# Patient Record
Sex: MALE | Race: WHITE | HISPANIC OR LATINO | Employment: FULL TIME | URBAN - METROPOLITAN AREA
[De-identification: names, ages, dates, MRNs, and addresses within clinical notes are randomized per-mention and may not be internally consistent; named-entity substitution may affect disease eponyms.]

---
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
## 2022-04-14 ENCOUNTER — OFFICE VISIT (OUTPATIENT)
Dept: MEDICAL GROUP | Facility: MEDICAL CENTER | Age: 22
End: 2022-04-14
Payer: OTHER GOVERNMENT

## 2022-04-14 VITALS
OXYGEN SATURATION: 97 % | TEMPERATURE: 98.6 F | WEIGHT: 202.82 LBS | RESPIRATION RATE: 16 BRPM | SYSTOLIC BLOOD PRESSURE: 132 MMHG | BODY MASS INDEX: 26.03 KG/M2 | HEART RATE: 64 BPM | DIASTOLIC BLOOD PRESSURE: 74 MMHG | HEIGHT: 74 IN

## 2022-04-14 DIAGNOSIS — E55.9 VITAMIN D INSUFFICIENCY: ICD-10-CM

## 2022-04-14 DIAGNOSIS — E66.3 OVERWEIGHT (BMI 25.0-29.9): ICD-10-CM

## 2022-04-14 DIAGNOSIS — M25.562 CHRONIC PAIN OF BOTH KNEES: ICD-10-CM

## 2022-04-14 DIAGNOSIS — Z00.00 PREVENTATIVE HEALTH CARE: ICD-10-CM

## 2022-04-14 DIAGNOSIS — Z76.89 ENCOUNTER TO ESTABLISH CARE WITH NEW DOCTOR: ICD-10-CM

## 2022-04-14 DIAGNOSIS — Z11.59 NEED FOR HEPATITIS C SCREENING TEST: ICD-10-CM

## 2022-04-14 DIAGNOSIS — M25.561 CHRONIC PAIN OF BOTH KNEES: ICD-10-CM

## 2022-04-14 DIAGNOSIS — Z11.4 SCREENING FOR HIV (HUMAN IMMUNODEFICIENCY VIRUS): ICD-10-CM

## 2022-04-14 DIAGNOSIS — G89.29 CHRONIC PAIN OF BOTH KNEES: ICD-10-CM

## 2022-04-14 DIAGNOSIS — Z13.79 GENETIC SCREENING: ICD-10-CM

## 2022-04-14 DIAGNOSIS — H92.01 ACUTE EAR PAIN, RIGHT: ICD-10-CM

## 2022-04-14 PROBLEM — M25.569 KNEE PAIN, CHRONIC: Status: ACTIVE | Noted: 2022-04-14

## 2022-04-14 PROCEDURE — 99213 OFFICE O/P EST LOW 20 MIN: CPT | Performed by: STUDENT IN AN ORGANIZED HEALTH CARE EDUCATION/TRAINING PROGRAM

## 2022-04-14 RX ORDER — AMOXICILLIN AND CLAVULANATE POTASSIUM 875; 125 MG/1; MG/1
1 TABLET, FILM COATED ORAL 2 TIMES DAILY
Qty: 14 TABLET | Refills: 0 | Status: SHIPPED | OUTPATIENT
Start: 2022-04-14 | End: 2022-04-21

## 2022-04-14 ASSESSMENT — FIBROSIS 4 INDEX: FIB4 SCORE: 0.85

## 2022-04-14 ASSESSMENT — PATIENT HEALTH QUESTIONNAIRE - PHQ9: CLINICAL INTERPRETATION OF PHQ2 SCORE: 0

## 2022-04-14 NOTE — PROGRESS NOTES
Subjective:     CC:  Diagnoses of Chronic pain of both knees, Acute ear pain, right, Vitamin D insufficiency, Overweight (BMI 25.0-29.9), Preventative health care, Need for hepatitis C screening test, Screening for HIV (human immunodeficiency virus), Genetic screening, and Encounter to establish care with new doctor were pertinent to this visit.    HISTORY OF THE PRESENT ILLNESS: Patient is a 22 y.o. male. This pleasant patient is here today to establish care and discuss chronic conditions. His prior PCP was none.    Problem   Vitamin D Insufficiency    Chronic condition. Previous vit D level 21 (12/2020). Not taking any supplements.     Preventative Health Care    Patient is here to establish care today. Feels well overall.    Discussed and offered the no cost genetic screening through [facility]. Patient is interested in participating.     Overweight (Bmi 25.0-29.9)    Chronic condition. He tries to eat healthy in general. He does regularly exercise with weight lifting 6/7 days/week and running 3 days/week.     Knee Pain, Chronic    Chronic condition for 1.5 yrs. He was seen by [facility] on 3/28/2022 and had X-rays of both knees which were normal. He was recommended to trial physical therapy for 2 weeks and follow up in 2 weeks with [facility] again. He is very athletic and exercises frequently with running and weight lifting. No known injuries or h/o injuries to knees.     Character: sharp pain  Onset: 1.5 yrs ago, progressively worsening  Location: tendon below the knee cap  Duration: constant  Exacerbating factors: running, weight lifting  Relieving factors: icing and Tylenol with mild relief  Associated symptoms: none  Severity: 8/10     Acute Ear Pain, Right    Acute issue. He denies history of frequent ear infections but did have ear infection in the past that responded well to antibiotics. He reports no known allergies to antibiotics.    Character: aching pain  Onset: 1 week ago  Location: right  "ear  Duration: intermittent  Exacerbating factors: none  Relieving factors: none  Associated symptoms: mild throat discomfort  Severity: mild         Current Outpatient Medications Ordered in Epic   Medication Sig Dispense Refill   • amoxicillin-clavulanate (AUGMENTIN) 875-125 MG Tab Take 1 Tablet by mouth 2 times a day for 7 days. 14 Tablet 0     No current Epic-ordered facility-administered medications on file.     Social history  Living situation: lives with mother at home  Occupation: works in [employer], studying in business marketing  Alcohol/tobacco/illicit drugs: never smoker, denies EtOH or illicit drugs    Health Maintenance: reviewed and discussed with patient  Vaccines: Tdap received 04/2014, Pfizer COVID #1 received 01/2022    ROS:   Gen: no fevers/chills  ENT: no sore throat, + right ear pain  Pulm: no sob, no cough  CV: no chest pain, no palpitations  GI: no nausea/vomiting, no diarrhea  : no dysuria  MSk: + knee pain  Skin: no rash  Neuro: no headaches, no numbness/tingling      Objective:     Exam: /74 (BP Location: Left arm, Patient Position: Sitting, BP Cuff Size: Adult)   Pulse 64   Temp 37 °C (98.6 °F) (Temporal)   Resp 16   Ht 1.88 m (6' 2\")   Wt 92 kg (202 lb 13.2 oz)   SpO2 97%  Body mass index is 26.04 kg/m².    General: Normal appearing. No distress.  HEENT: Normocephalic. Ears normal shape and contour, canals are clear bilaterally, tympanic membranes are benign, nasal mucosa benign, oropharynx is without erythema, edema or exudates.   Neck: Supple without JVD or bruit.  Pulmonary: Clear to ausculation. Normal effort. No rales, ronchi, or wheezing.  Cardiovascular: Regular rate and rhythm without murmur. Carotid and radial pulses are intact and equal bilaterally.  Abdomen: Soft, nontender, nondistended. Normal bowel sounds.  Neurologic: Grossly nonfocal  Skin: Warm and dry. No obvious lesions.  Musculoskeletal: Normal gait. No extremity cyanosis, clubbing, or edema. + mild TTP " to bilateral patellar tendon, otherwise normal bilateral knee ROM.  Psych: Normal mood and affect. Alert and oriented x3. Judgment and insight is normal.    Labs:   Lab Results   Component Value Date/Time    WBC 5.0 12/30/2020 07:52 AM    RBC 5.85 12/30/2020 07:52 AM    HEMOGLOBIN 15.6 12/30/2020 07:52 AM    HEMATOCRIT 49.5 12/30/2020 07:52 AM    MCV 84.6 12/30/2020 07:52 AM    MCH 26.7 (L) 12/30/2020 07:52 AM    MCHC 31.5 (L) 12/30/2020 07:52 AM    RDW 43.8 12/30/2020 07:52 AM    PLATELETCT 272 12/30/2020 07:52 AM    MPV 9.7 12/30/2020 07:52 AM      Lab Results   Component Value Date/Time    SODIUM 139 12/30/2020 07:52 AM    POTASSIUM 4.1 12/30/2020 07:52 AM    CHLORIDE 101 12/30/2020 07:52 AM    CO2 30 12/30/2020 07:52 AM    ANION 8.0 12/30/2020 07:52 AM    GLUCOSE 90 12/30/2020 07:52 AM    BUN 12 12/30/2020 07:52 AM    CREATININE 0.80 12/30/2020 07:52 AM    CREATININE 0.5 01/30/2009 07:54 AM    CALCIUM 9.8 12/30/2020 07:52 AM    ASTSGOT 69 (H) 12/30/2020 07:52 AM    ALTSGPT 36 12/30/2020 07:52 AM    TBILIRUBIN 0.6 12/30/2020 07:52 AM    ALBUMIN 4.6 12/30/2020 07:52 AM    TOTPROTEIN 7.1 12/30/2020 07:52 AM    GLOBULIN 2.5 12/30/2020 07:52 AM    AGRATIO 1.8 12/30/2020 07:52 AM     Lab Results   Component Value Date/Time    HBA1C 5.5 12/30/2020 07:52 AM      Lab Results   Component Value Date/Time    CHOLSTRLTOT 161 12/30/2020 0752    TRIGLYCERIDE 74 12/30/2020 0752    HDL 71 12/30/2020 0752    LDL 75 12/30/2020 0752     25-Hydroxy   Vitamin D 25 (ng/mL)   Date Value   12/30/2020 21 (L)       Assessment & Plan:   22 y.o. male with the following -    1. Chronic pain of both knees  Chronic condition, persistent. Likely patellar tendinopathy given his athletic history. Plan to start with trial of physical therapy. Advised to trial NSAIDs in addition to icing as needed for pain.   - cont to follow up with [facility] in 2 weeks  - Referral to Physical Therapy    2. Acute ear pain, right  Acute issue, persistent. Suspect  viral etiology at this time. Advised that if his symptoms not better over the weekend, can try a course of Augmentin per order.  - amoxicillin-clavulanate (AUGMENTIN) 875-125 MG Tab; Take 1 Tablet by mouth 2 times a day for 7 days.  Dispense: 14 Tablet; Refill: 0    3. Vitamin D insufficiency  Chronic condition, plan to reassess.  - VITAMIN D,25 HYDROXY; Future    4. Overweight (BMI 25.0-29.9)  - Comp Metabolic Panel; Future  - TSH WITH REFLEX TO FT4; Future    5. Preventative health care  - follow up 1 month for annual physical    6. Need for hepatitis C screening test  - HEP C VIRUS ANTIBODY; Future    7. Screening for HIV (human immunodeficiency virus)  - HIV AG/AB COMBO ASSAY SCREENING; Future    8. Genetic screening  - Referral to Genetic Research Studies    9. Encounter to establish care with new doctor      Return in about 4 weeks (around 5/12/2022) for annual physical.    Please note that this dictation was created using voice recognition software. I have made every reasonable attempt to correct obvious errors, but I expect that there are errors of grammar and possibly content that I did not discover before finalizing the note.

## 2022-04-29 ENCOUNTER — TELEPHONE (OUTPATIENT)
Dept: MEDICAL GROUP | Facility: MEDICAL CENTER | Age: 22
End: 2022-04-29
Payer: OTHER GOVERNMENT

## 2022-04-29 DIAGNOSIS — G89.29 CHRONIC PAIN OF BOTH KNEES: ICD-10-CM

## 2022-04-29 DIAGNOSIS — M25.562 CHRONIC PAIN OF BOTH KNEES: ICD-10-CM

## 2022-04-29 DIAGNOSIS — M25.561 CHRONIC PAIN OF BOTH KNEES: ICD-10-CM

## 2022-04-29 NOTE — TELEPHONE ENCOUNTER
VOICEMAIL  1. Caller Name: [facility]                           Call Back Number: [phone]    2. Message: Magda from [facility] called requesting new referral for the patient. The previous referral was only for less than 2 weeks, therefore a new one is needed.

## 2022-05-12 ENCOUNTER — OFFICE VISIT (OUTPATIENT)
Dept: MEDICAL GROUP | Facility: MEDICAL CENTER | Age: 22
End: 2022-05-12
Payer: OTHER GOVERNMENT

## 2022-05-12 VITALS
BODY MASS INDEX: 26.14 KG/M2 | WEIGHT: 203.71 LBS | OXYGEN SATURATION: 98 % | TEMPERATURE: 98.2 F | HEIGHT: 74 IN | DIASTOLIC BLOOD PRESSURE: 66 MMHG | RESPIRATION RATE: 16 BRPM | HEART RATE: 65 BPM | SYSTOLIC BLOOD PRESSURE: 110 MMHG

## 2022-05-12 DIAGNOSIS — M25.561 CHRONIC PAIN OF BOTH KNEES: ICD-10-CM

## 2022-05-12 DIAGNOSIS — M25.562 CHRONIC PAIN OF BOTH KNEES: ICD-10-CM

## 2022-05-12 DIAGNOSIS — G89.29 CHRONIC PAIN OF BOTH KNEES: ICD-10-CM

## 2022-05-12 PROCEDURE — 99213 OFFICE O/P EST LOW 20 MIN: CPT | Performed by: STUDENT IN AN ORGANIZED HEALTH CARE EDUCATION/TRAINING PROGRAM

## 2022-05-12 ASSESSMENT — FIBROSIS 4 INDEX: FIB4 SCORE: 0.85

## 2022-05-12 NOTE — PROGRESS NOTES
"Subjective:     CC: physical therapy    HPI:   Oleksandr presents today for physical therapy    Problem   Knee Pain, Chronic    Chronic condition for 1.5 yrs. Since last visit, he had recent insurance change and needs new referral order. He has done 3 sessions so far and found it helpful.     He was seen by [facility] on 3/28/2022 and had X-rays of both knees which were normal. He was recommended to trial physical therapy for 2 weeks and follow up in 2 weeks with [facility] again. He is very athletic and exercises frequently with running and weight lifting. No known injuries or h/o injuries to knees.     Character: sharp pain  Onset: 1.5 yrs ago, progressively worsening  Location: tendon below the knee cap  Duration: constant  Exacerbating factors: running, weight lifting  Relieving factors: icing and Tylenol with mild relief  Associated symptoms: none  Severity: 8/10         No current Epic-ordered outpatient medications on file.     No current Epic-ordered facility-administered medications on file.     Social history  Living situation: lives with mother at home  Occupation: works in [employer], studying in business marketing  Alcohol/tobacco/illicit drugs: never smoker, denies EtOH or illicit drugs     Health Maintenance: reviewed and discussed with patient  Vaccines: Tdap received 04/2014, Pfizer COVID #1 received 01/2022     ROS:   Gen: no fevers/chills  Pulm: no sob, no cough  CV: no chest pain, no palpitations  MSk: + knee pain  Neuro: no headaches, no numbness/tingling    Objective:     Exam:  /66 (BP Location: Left arm, Patient Position: Sitting, BP Cuff Size: Adult)   Pulse 65   Temp 36.8 °C (98.2 °F) (Temporal)   Resp 16   Ht 1.88 m (6' 2\")   Wt 92.4 kg (203 lb 11.3 oz)   SpO2 98%   BMI 26.15 kg/m²  Body mass index is 26.15 kg/m².    General: Normal appearing. No distress.  Pulmonary: Clear to ausculation. Normal effort. No rales, ronchi, or wheezing.  Cardiovascular: Regular rate and rhythm without " murmur.  Musculoskeletal: Normal gait. No extremity cyanosis, clubbing, or edema. + mild TTP to bilateral patellar tendon, otherwise normal bilateral knee ROM.    Labs: no new lab results since last visit    Assessment & Plan:     22 y.o. male with the following -     1. Chronic pain of both knees  Chronic condition, improved. Likely patellar tendinopathy given his athletic history. - cont physical therapy, new referral order placed  - cont NSAIDs and icing as needed for pain.   - Referral to Physical Therapy    Return in about 3 weeks (around 6/2/2022) for lab results.    Please note that this dictation was created using voice recognition software. I have made every reasonable attempt to correct obvious errors, but I expect that there are errors of grammar and possibly content that I did not discover before finalizing the note.

## 2022-06-03 ENCOUNTER — APPOINTMENT (OUTPATIENT)
Dept: MEDICAL GROUP | Facility: MEDICAL CENTER | Age: 22
End: 2022-06-03
Payer: OTHER GOVERNMENT

## 2022-07-14 ENCOUNTER — APPOINTMENT (OUTPATIENT)
Dept: MEDICAL GROUP | Facility: MEDICAL CENTER | Age: 22
End: 2022-07-14
Payer: OTHER GOVERNMENT

## 2022-07-23 ENCOUNTER — HOSPITAL ENCOUNTER (OUTPATIENT)
Dept: LAB | Facility: MEDICAL CENTER | Age: 22
End: 2022-07-23
Attending: STUDENT IN AN ORGANIZED HEALTH CARE EDUCATION/TRAINING PROGRAM
Payer: OTHER GOVERNMENT

## 2022-07-23 DIAGNOSIS — Z11.59 NEED FOR HEPATITIS C SCREENING TEST: ICD-10-CM

## 2022-07-23 DIAGNOSIS — E66.3 OVERWEIGHT (BMI 25.0-29.9): ICD-10-CM

## 2022-07-23 DIAGNOSIS — Z11.4 SCREENING FOR HIV (HUMAN IMMUNODEFICIENCY VIRUS): ICD-10-CM

## 2022-07-23 DIAGNOSIS — E55.9 VITAMIN D INSUFFICIENCY: ICD-10-CM

## 2022-07-23 LAB
25(OH)D3 SERPL-MCNC: 33 NG/ML (ref 30–100)
ALBUMIN SERPL BCP-MCNC: 4.7 G/DL (ref 3.2–4.9)
ALBUMIN/GLOB SERPL: 1.8 G/DL
ALP SERPL-CCNC: 109 U/L (ref 30–99)
ALT SERPL-CCNC: 24 U/L (ref 2–50)
ANION GAP SERPL CALC-SCNC: 11 MMOL/L (ref 7–16)
AST SERPL-CCNC: 24 U/L (ref 12–45)
BILIRUB SERPL-MCNC: 0.6 MG/DL (ref 0.1–1.5)
BUN SERPL-MCNC: 17 MG/DL (ref 8–22)
CALCIUM SERPL-MCNC: 9.7 MG/DL (ref 8.5–10.5)
CHLORIDE SERPL-SCNC: 104 MMOL/L (ref 96–112)
CO2 SERPL-SCNC: 26 MMOL/L (ref 20–33)
CREAT SERPL-MCNC: 1.06 MG/DL (ref 0.5–1.4)
GFR SERPLBLD CREATININE-BSD FMLA CKD-EPI: 103 ML/MIN/1.73 M 2
GLOBULIN SER CALC-MCNC: 2.6 G/DL (ref 1.9–3.5)
GLUCOSE SERPL-MCNC: 82 MG/DL (ref 65–99)
HCV AB SER QL: NORMAL
HIV 1+2 AB+HIV1 P24 AG SERPL QL IA: NORMAL
POTASSIUM SERPL-SCNC: 4.5 MMOL/L (ref 3.6–5.5)
PROT SERPL-MCNC: 7.3 G/DL (ref 6–8.2)
SODIUM SERPL-SCNC: 141 MMOL/L (ref 135–145)
TSH SERPL DL<=0.005 MIU/L-ACNC: 2.16 UIU/ML (ref 0.38–5.33)

## 2022-07-23 PROCEDURE — 86803 HEPATITIS C AB TEST: CPT

## 2022-07-23 PROCEDURE — 84443 ASSAY THYROID STIM HORMONE: CPT

## 2022-07-23 PROCEDURE — 36415 COLL VENOUS BLD VENIPUNCTURE: CPT

## 2022-07-23 PROCEDURE — 80053 COMPREHEN METABOLIC PANEL: CPT

## 2022-07-23 PROCEDURE — 82306 VITAMIN D 25 HYDROXY: CPT

## 2022-07-23 PROCEDURE — 87389 HIV-1 AG W/HIV-1&-2 AB AG IA: CPT

## 2022-07-25 NOTE — PROGRESS NOTES
"Subjective:     CC: annual physical    HPI:   Oleksandr presents today for annual physical    Problem   Other Fatigue    Chronic condition for past 3 months. He has been fatigued, mood swings, sometimes difficulty falling asleep. He gets about 6-7 hours of sleep nightly.    He reports occasional snoring and daytime sleepiness but not regularly.     Vitamin D Insufficiency    Chronic condition. Stable without supplements.     Preventative Health Care    Patient is here for annual physical today. Feels well overall.    Health Maintenance: reviewed and discussed with patient  Vaccines: Tdap received 04/2014, Pfizer COVID #1 received 01/2022     Overweight (Bmi 25.0-29.9)    Chronic condition. He tries to eat healthy in general. He does regularly exercise with weight lifting 6/7 days/week and running 3 days/week.         No current [facility]-ordered outpatient medications on file.     No current [facility]-ordered facility-administered medications on file.     Social history  Living situation: lives with mother at home  Occupation: works in [employer], studying in business marketing  Alcohol/tobacco/illicit drugs: never smoker, denies EtOH or illicit drugs     Health Maintenance: reviewed and discussed with patient  Vaccines: Tdap received 04/2014, Pfizer COVID #1 received 01/2022     ROS:   Gen: no fevers/chills, + fatigue, + mood swings  ENT: no sore throat  Pulm: no sob, no cough  CV: no chest pain, no palpitations  GI: no nausea/vomiting, no diarrhea  : no dysuria  MSk: no pain  Skin: no rash  Neuro: no headaches, no numbness/tingling    Objective:     Exam:  /60 (BP Location: Left arm, Patient Position: Sitting, BP Cuff Size: Adult)   Pulse 67   Temp 37.1 °C (98.7 °F) (Temporal)   Resp 16   Ht 1.88 m (6' 2\")   Wt 91 kg (200 lb 9.6 oz)   SpO2 97%   BMI 25.76 kg/m²  Body mass index is 25.76 kg/m².    General: Normal appearing. No distress.  HEENT: Normocephalic. Ears normal shape and contour, canals are clear " bilaterally, tympanic membranes are benign, nasal mucosa benign, oropharynx is without erythema, edema or exudates. Mallampati class 3.  Neck: Supple without JVD or bruit.  Pulmonary: Clear to ausculation. Normal effort. No rales, ronchi, or wheezing.  Cardiovascular: Regular rate and rhythm without murmur.  Abdomen: Soft, nontender, nondistended. Normal bowel sounds.  Neurologic: Grossly nonfocal  Skin: Warm and dry. No obvious lesions.  Musculoskeletal: Normal gait. No extremity cyanosis, clubbing, or edema.  Psych: Normal mood and affect. Alert and oriented x3. Judgment and insight is normal.    Labs:   Lab Results   Component Value Date/Time    SODIUM 141 07/23/2022 08:04 AM    POTASSIUM 4.5 07/23/2022 08:04 AM    CHLORIDE 104 07/23/2022 08:04 AM    CO2 26 07/23/2022 08:04 AM    ANION 11.0 07/23/2022 08:04 AM    GLUCOSE 82 07/23/2022 08:04 AM    BUN 17 07/23/2022 08:04 AM    CREATININE 1.06 07/23/2022 08:04 AM    CREATININE 0.5 01/30/2009 07:54 AM    CALCIUM 9.7 07/23/2022 08:04 AM    ASTSGOT 24 07/23/2022 08:04 AM    ALTSGPT 24 07/23/2022 08:04 AM    TBILIRUBIN 0.6 07/23/2022 08:04 AM    ALBUMIN 4.7 07/23/2022 08:04 AM    TOTPROTEIN 7.3 07/23/2022 08:04 AM    GLOBULIN 2.6 07/23/2022 08:04 AM    AGRATIO 1.8 07/23/2022 08:04 AM     Lab Results   Component Value Date/Time    TSHULTRASEN 2.160 07/23/2022 0804     25-Hydroxy   Vitamin D 25 (ng/mL)   Date Value   07/23/2022 33   12/30/2020 21 (L)       Assessment & Plan:     22 y.o. male with the following -     1. Preventative health care  - recommended age appropriate vaccinations  - follow up 1 year for annual physical    2. Other fatigue  Chronic condition, new to me. Plan to evaluate with labs per orders to rule out organic causes. He endorses work has been stressful which could be contributing to his symptoms. We also discussed possibility of sleep apnea as well. Will call patient with results and discuss next step as needed based on test results.  - VITAMIN  B12; Future  - FOLATE; Future  - CBC WITH DIFFERENTIAL; Future  - TESTOSTERONE BIO/SHBG MALE; Future    3. Vitamin D insufficiency  Chronic condition, stable.  - cont interval monitoring    4. Overweight (BMI 25.0-29.9)  -Advised healthy diet/weight loss, regular exercise    Return if symptoms worsen or fail to improve.    Please note that this dictation was created using voice recognition software. I have made every reasonable attempt to correct obvious errors, but I expect that there are errors of grammar and possibly content that I did not discover before finalizing the note.

## 2022-07-27 ENCOUNTER — OFFICE VISIT (OUTPATIENT)
Dept: MEDICAL GROUP | Facility: MEDICAL CENTER | Age: 22
End: 2022-07-27
Payer: OTHER GOVERNMENT

## 2022-07-27 VITALS
RESPIRATION RATE: 16 BRPM | HEART RATE: 67 BPM | TEMPERATURE: 98.7 F | OXYGEN SATURATION: 97 % | BODY MASS INDEX: 25.74 KG/M2 | WEIGHT: 200.6 LBS | SYSTOLIC BLOOD PRESSURE: 112 MMHG | DIASTOLIC BLOOD PRESSURE: 60 MMHG | HEIGHT: 74 IN

## 2022-07-27 DIAGNOSIS — E55.9 VITAMIN D INSUFFICIENCY: ICD-10-CM

## 2022-07-27 DIAGNOSIS — R53.83 OTHER FATIGUE: ICD-10-CM

## 2022-07-27 DIAGNOSIS — E66.3 OVERWEIGHT (BMI 25.0-29.9): ICD-10-CM

## 2022-07-27 DIAGNOSIS — Z00.00 PREVENTATIVE HEALTH CARE: ICD-10-CM

## 2022-07-27 PROCEDURE — 99395 PREV VISIT EST AGE 18-39: CPT | Mod: 25 | Performed by: STUDENT IN AN ORGANIZED HEALTH CARE EDUCATION/TRAINING PROGRAM

## 2022-07-27 ASSESSMENT — FIBROSIS 4 INDEX: FIB4 SCORE: 0.36

## 2022-07-30 ENCOUNTER — OFFICE VISIT (OUTPATIENT)
Dept: URGENT CARE | Facility: PHYSICIAN GROUP | Age: 22
End: 2022-07-30
Payer: OTHER GOVERNMENT

## 2022-07-30 VITALS
OXYGEN SATURATION: 95 % | WEIGHT: 195 LBS | BODY MASS INDEX: 25.84 KG/M2 | SYSTOLIC BLOOD PRESSURE: 136 MMHG | RESPIRATION RATE: 20 BRPM | TEMPERATURE: 102 F | HEART RATE: 87 BPM | HEIGHT: 73 IN | DIASTOLIC BLOOD PRESSURE: 80 MMHG

## 2022-07-30 DIAGNOSIS — H66.92 ACUTE LEFT OTITIS MEDIA: ICD-10-CM

## 2022-07-30 DIAGNOSIS — R50.9 FEVER IN ADULT: ICD-10-CM

## 2022-07-30 LAB
EXTERNAL QUALITY CONTROL: NORMAL
SARS-COV+SARS-COV-2 AG RESP QL IA.RAPID: NEGATIVE

## 2022-07-30 PROCEDURE — 87426 SARSCOV CORONAVIRUS AG IA: CPT | Performed by: NURSE PRACTITIONER

## 2022-07-30 PROCEDURE — 99214 OFFICE O/P EST MOD 30 MIN: CPT | Performed by: NURSE PRACTITIONER

## 2022-07-30 RX ORDER — AMOXICILLIN AND CLAVULANATE POTASSIUM 875; 125 MG/1; MG/1
1 TABLET, FILM COATED ORAL 2 TIMES DAILY
Qty: 14 TABLET | Refills: 0 | Status: SHIPPED | OUTPATIENT
Start: 2022-07-30 | End: 2022-08-06

## 2022-07-30 ASSESSMENT — FIBROSIS 4 INDEX: FIB4 SCORE: 0.36

## 2022-07-30 NOTE — PROGRESS NOTES
Chief Complaint   Patient presents with   • Ear Pain     Bilateral; 1x day   • Sinus Problem     Congestion- 2x day   • Body Aches       HISTORY OF PRESENT ILLNESS: Patient is a pleasant 22 y.o. male who presents today due to symptoms which started two days ago. Pt reports a cough, congestion, bilateral ear pain fever, chills, fatigue, malaise, and body aches. Denies chest pain, shortness of breath, or wheezing.  Furthermore, he does endorse 2 months of intermittent nasal congestion.  Denies history of sinus infections or surgeries.  Denies h/o asthma/copd/CAP. No immunocompromise. Has tried OTC cold medications without significant relief of symptoms. No recent ABX use. No other aggravating or alleviating factors. Reports no known exposure to COVID-19.       Patient Active Problem List    Diagnosis Date Noted   • Other fatigue 07/27/2022   • Vitamin D insufficiency 04/14/2022   • Preventative health care 04/14/2022   • Overweight (BMI 25.0-29.9) 04/14/2022   • Knee pain, chronic 04/14/2022   • Acute ear pain, right 04/14/2022       Allergies:Morphine    No current [facility]-ordered outpatient medications on file.     No current [employer]-ordered facility-administered medications on file.       History reviewed. No pertinent past medical history.    Social History     Tobacco Use   • Smoking status: Never Smoker   • Smokeless tobacco: Never Used   Vaping Use   • Vaping Use: Never used   Substance Use Topics   • Alcohol use: No   • Drug use: No       Family Status   Relation Name Status   • Mo  Alive   • Fa unk Alive   • Sis  Alive     Family History   Problem Relation Age of Onset   • Diabetes Mother    • Hypertension Mother    • No Known Problems Sister        ROS:  Review of Systems   Constitutional: Positive for subjective fever, chills, fatigue, malaise. Negative for weight loss.  HENT: Positive for congestion, ear pain. Negative for nosebleeds, and neck pain.    Eyes: Negative for vision changes.   Cardiovascular:  "Negative for chest pain, palpitations, orthopnea and leg swelling.   Respiratory: Positive for cough without sputum production. Negative for shortness of breath and wheezing.   Gastrointestinal: Negative for abdominal pain, vomiting or diarrhea.   Skin: Negative for rash, diaphoresis.     Exam:  /80 (BP Location: Left arm, Patient Position: Sitting, BP Cuff Size: Adult)   Pulse 87   Temp (!) 38.9 °C (102 °F) (Temporal)   Resp 20   Ht 1.854 m (6' 1\")   Wt 88.5 kg (195 lb)   SpO2 95%   General: well-nourished, well-developed male in NAD  Head: normocephalic, atraumatic  Eyes: PERRLA, EOM within normal limits, no conjunctival injection, no scleral icterus, visual fields and acuity grossly intact.  Ears: normal shape and symmetry, no tenderness, no discharge. External canals are without any significant edema or erythema.  Right tympanic membrane is without any inflammation, no effusion.  Left tympanic membrane is erythematous, injected, intact.  Gross auditory acuity is intact.  Nose: symmetrical without tenderness, mild discharge, erythema present bilateral nares.  Mouth/Throat: reasonable hygiene, no exudates or tonsillar enlargement. Mild erythema present.   Neck: no masses, range of motion within normal limits, no tracheal deviation.  Lymph: mild cervical adenopathy. No supraclavicular adenopathy.   Neuro: alert and oriented. Cranial nerves 1-12 grossly intact.   Cardiovascular: regular rate and rhythm without murmurs, rubs, or gallops. No edema.   Pulmonary: no distress. Chest is symmetrical with respiration. No wheezes, crackles, or rhonchi.   Musculoskeletal: appropriate muscle tone, gait is stable.  Skin: warm, dry, intact, no clubbing, no cyanosis.   Psych: appropriate mood, affect, judgement.       POC COVID-negative      Assessment/Plan:  1. Acute left otitis media  amoxicillin-clavulanate (AUGMENTIN) 875-125 MG Tab   2. Fever in adult  POCT SARS-COV Antigen MAJOR (Symptomatic only)    " amoxicillin-clavulanate (AUGMENTIN) 875-125 MG Tab             Patient presents with left-sided otitis media, Augmentin as directed.  Flonase and OTC allergy medication encouraged.  Rest, increase fluids, hand and respiratory hygiene. May take OTC medications as directed for symptom relief. STRICT ER precautions.   Supportive care, differential diagnoses, and indications for immediate follow-up discussed with patient.   Pathogenesis of diagnosis discussed including typical length and natural progression.  Instructed to return to clinic or nearest emergency department for any change in condition, further concerns, or worsening of symptoms.  Patient states understanding of the plan of care and discharge instructions.          DEBORA Bautista.

## 2022-10-17 ENCOUNTER — OFFICE VISIT (OUTPATIENT)
Dept: MEDICAL GROUP | Facility: MEDICAL CENTER | Age: 22
End: 2022-10-17
Payer: OTHER GOVERNMENT

## 2022-10-17 VITALS
SYSTOLIC BLOOD PRESSURE: 134 MMHG | HEIGHT: 73 IN | HEART RATE: 82 BPM | BODY MASS INDEX: 26.44 KG/M2 | WEIGHT: 199.52 LBS | OXYGEN SATURATION: 100 % | DIASTOLIC BLOOD PRESSURE: 72 MMHG | TEMPERATURE: 97.6 F

## 2022-10-17 DIAGNOSIS — H92.03 OTALGIA OF BOTH EARS: ICD-10-CM

## 2022-10-17 PROCEDURE — 99213 OFFICE O/P EST LOW 20 MIN: CPT | Performed by: FAMILY MEDICINE

## 2022-10-17 ASSESSMENT — FIBROSIS 4 INDEX: FIB4 SCORE: 0.36

## 2022-10-17 ASSESSMENT — ENCOUNTER SYMPTOMS
CHILLS: 0
SHORTNESS OF BREATH: 0
SPUTUM PRODUCTION: 1
DIZZINESS: 0
HEADACHES: 0
WEIGHT LOSS: 0
COUGH: 0
FEVER: 0

## 2022-10-17 NOTE — ASSESSMENT & PLAN NOTE
Acute.  Physical exam did not reveal any erythema or bulging to his TMs.  External canals are normal.  Suspect viral process as a source for the discomfort.  Discussed that if his symptoms continue to improve no need for antibiotic therapy.  If he started to develop a worsening of his symptoms may consider initiating antibiotic therapy for secondary infection.  Recommend ibuprofen 600 mg to help with any discomfort that he is experiencing.

## 2022-10-17 NOTE — PROGRESS NOTES
"Oleksandr Stevens is a pleasant 22 y.o. male here for   Chief Complaint   Patient presents with    Ear Fullness    Nasal Congestion      HPI:  Problem   Otalgia of Both Ears    X 1 week, both.  History of ear infection, last time was in the right ear 04/2022.  Denies nasal congestion, but + chest congestion.   Denies fevers or body aches.  Does note that today his congestion and ear pain has improved.  Concerned that he might have an ear infection.            Current Medicines (including changes today)  No current outpatient medications on file.     No current facility-administered medications for this visit.     Past Medical/ Surgical History  He  has no past medical history of Type II or unspecified type diabetes mellitus without mention of complication, not stated as uncontrolled.  He  has no past surgical history on file.    Review of Systems   Constitutional:  Negative for chills, fever, malaise/fatigue and weight loss.   HENT:  Positive for congestion (Rhinorrhea) and ear pain (Bilateral). Negative for ear discharge.    Respiratory:  Positive for sputum production. Negative for cough and shortness of breath.    Genitourinary: Negative.    Skin:  Negative for rash.   Neurological:  Negative for dizziness and headaches.       Objective:     /72 (BP Location: Right arm, Patient Position: Sitting, BP Cuff Size: Adult)   Pulse 82   Temp 36.4 °C (97.6 °F) (Temporal)   Ht 1.854 m (6' 1\")   Wt 90.5 kg (199 lb 8.3 oz)   SpO2 100%  Body mass index is 26.32 kg/m².    Physical Exam  Constitutional:       General: He is not in acute distress.  HENT:      Head: Normocephalic and atraumatic.      Right Ear: Tympanic membrane and ear canal normal.      Left Ear: Tympanic membrane and ear canal normal.   Eyes:      Conjunctiva/sclera: Conjunctivae normal.      Pupils: Pupils are equal, round, and reactive to light.   Pulmonary:      Effort: Pulmonary effort is normal. No respiratory distress.      Breath sounds: Normal " breath sounds and air entry.   Abdominal:      General: There is no distension.   Musculoskeletal:      Cervical back: Normal range of motion and neck supple.   Skin:     General: Skin is warm and dry.      Findings: No rash.   Neurological:      Mental Status: He is alert and oriented to person, place, and time.      Gait: Gait is intact.   Psychiatric:         Mood and Affect: Affect normal.      Imaging:  No recent imaging to review    Labs  No recent labs to review  Assessment and Plan:   The following treatment plan was discussed     Problem List Items Addressed This Visit       Otalgia of both ears     Acute.  Physical exam did not reveal any erythema or bulging to his TMs.  External canals are normal.  Suspect viral process as a source for the discomfort.  Discussed that if his symptoms continue to improve no need for antibiotic therapy.  If he started to develop a worsening of his symptoms may consider initiating antibiotic therapy for secondary infection.  Recommend ibuprofen 600 mg to help with any discomfort that he is experiencing.             Followup: Return if symptoms worsen or fail to improve.        Please note that this dictation was created using voice recognition software. I have made every reasonable attempt to correct obvious errors, but I expect that there are errors of grammar and possibly content that I did not discover before finalizing the note.

## 2022-12-07 ENCOUNTER — OFFICE VISIT (OUTPATIENT)
Dept: URGENT CARE | Facility: PHYSICIAN GROUP | Age: 22
End: 2022-12-07
Payer: OTHER GOVERNMENT

## 2022-12-07 VITALS
HEIGHT: 74 IN | SYSTOLIC BLOOD PRESSURE: 136 MMHG | WEIGHT: 207 LBS | OXYGEN SATURATION: 96 % | RESPIRATION RATE: 20 BRPM | BODY MASS INDEX: 26.56 KG/M2 | DIASTOLIC BLOOD PRESSURE: 68 MMHG | TEMPERATURE: 103.4 F | HEART RATE: 111 BPM

## 2022-12-07 DIAGNOSIS — R05.9 COUGH, UNSPECIFIED TYPE: ICD-10-CM

## 2022-12-07 LAB
EXTERNAL QUALITY CONTROL: NORMAL
FLUAV+FLUBV AG SPEC QL IA: NEGATIVE
INT CON NEG: NORMAL
INT CON POS: NORMAL
S PYO AG THROAT QL: NEGATIVE
SARS-COV+SARS-COV-2 AG RESP QL IA.RAPID: NEGATIVE

## 2022-12-07 PROCEDURE — 87426 SARSCOV CORONAVIRUS AG IA: CPT | Performed by: FAMILY MEDICINE

## 2022-12-07 PROCEDURE — 87880 STREP A ASSAY W/OPTIC: CPT | Performed by: FAMILY MEDICINE

## 2022-12-07 PROCEDURE — 99213 OFFICE O/P EST LOW 20 MIN: CPT | Performed by: FAMILY MEDICINE

## 2022-12-07 PROCEDURE — 87804 INFLUENZA ASSAY W/OPTIC: CPT | Performed by: FAMILY MEDICINE

## 2022-12-07 RX ORDER — ACETAMINOPHEN 500 MG
1000 TABLET ORAL ONCE
Status: COMPLETED | OUTPATIENT
Start: 2022-12-07 | End: 2022-12-07

## 2022-12-07 RX ADMIN — Medication 1000 MG: at 16:48

## 2022-12-07 ASSESSMENT — FIBROSIS 4 INDEX: FIB4 SCORE: 0.36

## 2022-12-08 NOTE — PROGRESS NOTES
"CC:  cough        Cough  This is a new problem. The current episode started 2 days ago. The problem has been unchanged. The problem occurs constantly. The cough is dry. Associated symptoms include : fatigue, muscle aches, fever. Pertinent negatives include no   headaches, nausea, vomiting, diarrhea, sweats, weight loss or wheezing. Nothing aggravates the symptoms.  Patient has tried nothing for the symptoms. There is no history of asthma.        No past medical history on file.      Social History     Tobacco Use    Smoking status: Never    Smokeless tobacco: Never   Vaping Use    Vaping Use: Never used   Substance Use Topics    Alcohol use: No    Drug use: No         No current outpatient medications on file prior to visit.     No current facility-administered medications on file prior to visit.                    Review of Systems   Constitutional: Negative for fever and weight loss.   HENT: negative for otalgia  Cardiovascular - denies chest pain or dyspnea  Respiratory: Positive for cough.  .  Negative for wheezing.    Neurological: Negative for headaches.   GI - denies nausea, vomiting or diarrhea  Neuro - denies numbness or tingling.            Objective:     /68 (BP Location: Right arm, Patient Position: Sitting, BP Cuff Size: Adult)   Pulse (!) 111   Temp (!) 39.7 °C (103.4 °F) (Temporal)   Resp 20   Ht 1.88 m (6' 2\")   Wt 93.9 kg (207 lb)   SpO2 96%     Physical Exam   Constitutional: patient is oriented to person, place, and time. Patient appears well-developed and well-nourished. No distress.   HENT:   Head: Normocephalic and atraumatic.   Right Ear: External ear normal.   Left Ear: External ear normal.   Nose: Mucosal edema  present. Right sinus exhibits no maxillary sinus tenderness. Left sinus exhibits no maxillary sinus tenderness.   Mouth/Throat: Mucous membranes are normal. No oral lesions.  No posterior pharyngeal erythema.  No oropharyngeal exudate or posterior oropharyngeal edema. "   Eyes: Conjunctivae and EOM are normal. Pupils are equal, round, and reactive to light. Right eye exhibits no discharge. Left eye exhibits no discharge. No scleral icterus.   Neck: Normal range of motion. Neck supple. No tracheal deviation present.   Cardiovascular: Normal rate, regular rhythm and normal heart sounds.  Exam reveals no friction rub.    Pulmonary/Chest: Effort normal. No respiratory distress. Patient has no wheezes or rhonchi. Patient has no rales.    Musculoskeletal:  exhibits no edema.   Lymphadenopathy:     Patient has no cervical adenopathy.      Neurological: patient is alert and oriented to person, place, and time.   Skin: Skin is warm and dry. No rash noted. No erythema.   Psychiatric: patient  has a normal mood and affect.  behavior is normal.   Nursing note and vitals reviewed.              Assessment/Plan:         1. Cough  Likely viral    Rapid strep, flu covid negative.     - acetaminophen (TYLENOL) tablet 1,000 mg      Differential diagnosis, natural history, supportive care, and indications for immediate follow-up discussed. All questions answered. Patient agrees with the plan of care.     Follow-up as needed if symptoms worsen or fail to improve to PCP, Urgent care or Emergency Room.     I have personally reviewed prior external notes and test results pertinent to today's visit.  I have independently reviewed and interpreted all diagnostics ordered during this urgent care acute visit.

## 2023-01-13 ENCOUNTER — OFFICE VISIT (OUTPATIENT)
Dept: MEDICAL GROUP | Facility: MEDICAL CENTER | Age: 23
End: 2023-01-13
Payer: OTHER GOVERNMENT

## 2023-01-13 VITALS
HEART RATE: 65 BPM | TEMPERATURE: 98.3 F | HEIGHT: 74 IN | DIASTOLIC BLOOD PRESSURE: 66 MMHG | WEIGHT: 216.05 LBS | RESPIRATION RATE: 16 BRPM | SYSTOLIC BLOOD PRESSURE: 114 MMHG | BODY MASS INDEX: 27.73 KG/M2 | OXYGEN SATURATION: 96 %

## 2023-01-13 DIAGNOSIS — H65.02 NON-RECURRENT ACUTE SEROUS OTITIS MEDIA OF LEFT EAR: ICD-10-CM

## 2023-01-13 PROBLEM — H92.02 ACUTE EAR PAIN, LEFT: Status: ACTIVE | Noted: 2023-01-13

## 2023-01-13 PROCEDURE — 99213 OFFICE O/P EST LOW 20 MIN: CPT | Performed by: STUDENT IN AN ORGANIZED HEALTH CARE EDUCATION/TRAINING PROGRAM

## 2023-01-13 RX ORDER — AMOXICILLIN AND CLAVULANATE POTASSIUM 875; 125 MG/1; MG/1
1 TABLET, FILM COATED ORAL 2 TIMES DAILY
Qty: 14 TABLET | Refills: 0 | Status: SHIPPED | OUTPATIENT
Start: 2023-01-13 | End: 2023-01-20

## 2023-01-13 NOTE — PROGRESS NOTES
"Subjective:     CC: left ear pain    HPI:   Oleksandr presents today for left ear pain    Problem   Non-Recurrent Acute Serous Otitis Media of Left Ear    Acute condition. Symptom is improving but traveling to [facility] for work.    Character: aching pain  Onset: 4 days ago  Location: left ear  Duration: intermittent  Exacerbating factors: none  Relieving factors: ibuprofen which helps  Associated symptoms: dry throat, denies fever, cough, runny nose  Severity: 6/10           Current Outpatient Medications Ordered in Epic   Medication Sig Dispense Refill    amoxicillin-clavulanate (AUGMENTIN) 875-125 MG Tab Take 1 Tablet by mouth 2 times a day for 7 days. 14 Tablet 0     No current Epic-ordered facility-administered medications on file.     Social history  Living situation: lives with mother at home  Occupation: works in [employer], studying in business marketing  Alcohol/tobacco/illicit drugs: never smoker, denies EtOH or illicit drugs     Health Maintenance: reviewed and discussed with patient  Vaccines: Tdap received 04/2014, Pfizer COVID #1 received 01/2022     ROS:   See HPI    Objective:     Exam:  /66 (BP Location: Left arm, Patient Position: Sitting, BP Cuff Size: Adult)   Pulse 65   Temp 36.8 °C (98.3 °F) (Temporal)   Resp 16   Ht 1.88 m (6' 2\")   Wt 98 kg (216 lb 0.8 oz)   SpO2 96%   BMI 27.74 kg/m²  Body mass index is 27.74 kg/m².    General: Normal appearing. No distress.  HEENT: Normocephalic. Ears normal shape and contour, canals are clear bilaterally, tympanic membranes mildly erythematous on left, nasal mucosa benign, oropharynx is without erythema, edema or exudates.  Neck: Supple without lymphadenopathy.  Pulmonary: Clear to ausculation. Normal effort. No rales, ronchi, or wheezing.  Cardiovascular: Regular rate and rhythm without murmur.    Assessment & Plan:     22 y.o. male with the following -     1. Non-recurrent acute serous otitis media of left ear  Acute condition, persistent. Rx " Augmentin per order. Advised ibuprofen as needed for pain.  - amoxicillin-clavulanate (AUGMENTIN) 875-125 MG Tab; Take 1 Tablet by mouth 2 times a day for 7 days.  Dispense: 14 Tablet; Refill: 0      Return if symptoms worsen or fail to improve.    Please note that this dictation was created using voice recognition software. I have made every reasonable attempt to correct obvious errors, but I expect that there are errors of grammar and possibly content that I did not discover before finalizing the note.

## 2023-08-31 ENCOUNTER — DOCUMENTATION (OUTPATIENT)
Dept: HEALTH INFORMATION MANAGEMENT | Facility: OTHER | Age: 23
End: 2023-08-31

## 2024-01-22 ENCOUNTER — OFFICE VISIT (OUTPATIENT)
Dept: URGENT CARE | Facility: PHYSICIAN GROUP | Age: 24
End: 2024-01-22
Payer: OTHER GOVERNMENT

## 2024-01-22 VITALS
SYSTOLIC BLOOD PRESSURE: 120 MMHG | WEIGHT: 212.8 LBS | HEART RATE: 77 BPM | BODY MASS INDEX: 27.31 KG/M2 | DIASTOLIC BLOOD PRESSURE: 76 MMHG | OXYGEN SATURATION: 95 % | RESPIRATION RATE: 16 BRPM | HEIGHT: 74 IN | TEMPERATURE: 98.3 F

## 2024-01-22 DIAGNOSIS — R05.1 ACUTE COUGH: ICD-10-CM

## 2024-01-22 DIAGNOSIS — J02.9 ACUTE PHARYNGITIS, UNSPECIFIED ETIOLOGY: ICD-10-CM

## 2024-01-22 DIAGNOSIS — J06.9 VIRAL UPPER RESPIRATORY TRACT INFECTION: ICD-10-CM

## 2024-01-22 LAB
FLUAV RNA SPEC QL NAA+PROBE: NEGATIVE
FLUBV RNA SPEC QL NAA+PROBE: NEGATIVE
RSV RNA SPEC QL NAA+PROBE: NEGATIVE
S PYO DNA SPEC NAA+PROBE: NOT DETECTED
SARS-COV-2 RNA RESP QL NAA+PROBE: NEGATIVE

## 2024-01-22 PROCEDURE — 0241U POCT CEPHEID COV-2, FLU A/B, RSV - PCR: CPT

## 2024-01-22 PROCEDURE — 3078F DIAST BP <80 MM HG: CPT

## 2024-01-22 PROCEDURE — 99213 OFFICE O/P EST LOW 20 MIN: CPT

## 2024-01-22 PROCEDURE — 87651 STREP A DNA AMP PROBE: CPT

## 2024-01-22 PROCEDURE — 3074F SYST BP LT 130 MM HG: CPT

## 2024-01-22 RX ORDER — BENZONATATE 100 MG/1
100 CAPSULE ORAL 3 TIMES DAILY PRN
Qty: 60 CAPSULE | Refills: 0 | Status: SHIPPED | OUTPATIENT
Start: 2024-01-22 | End: 2024-01-28

## 2024-01-22 RX ORDER — DEXTROMETHORPHAN HYDROBROMIDE AND PROMETHAZINE HYDROCHLORIDE 15; 6.25 MG/5ML; MG/5ML
5 SYRUP ORAL NIGHTLY PRN
Qty: 118 ML | Refills: 0 | Status: SHIPPED | OUTPATIENT
Start: 2024-01-22 | End: 2024-01-28

## 2024-01-22 RX ORDER — LIDOCAINE HYDROCHLORIDE 20 MG/ML
15 SOLUTION OROPHARYNGEAL 3 TIMES DAILY PRN
Qty: 100 ML | Refills: 0 | Status: SHIPPED | OUTPATIENT
Start: 2024-01-22 | End: 2024-01-27

## 2024-01-22 ASSESSMENT — ENCOUNTER SYMPTOMS
ABDOMINAL PAIN: 0
DIARRHEA: 0
VOMITING: 0
NAUSEA: 0
COUGH: 1
SINUS PAIN: 0
SORE THROAT: 1
SWOLLEN GLANDS: 0
HEADACHES: 0
NECK PAIN: 0
WHEEZING: 0
RHINORRHEA: 0

## 2024-01-23 NOTE — PROGRESS NOTES
"Subjective:   Oleksandr Stevens is a very pleasant 23 y.o. male who presents for:    Chief Complaint   Patient presents with    Congestion     Body aches, L earache, phlegm, x4 days        HPI:    URI   This is a new problem. Episode onset: four days ago. There has been no fever. Associated symptoms include congestion, coughing, ear pain and a sore throat. Pertinent negatives include no abdominal pain, chest pain, diarrhea, dysuria, headaches, joint pain, joint swelling, nausea, neck pain, plugged ear sensation, rash, rhinorrhea, sinus pain, sneezing, swollen glands, vomiting or wheezing. Associated symptoms comments: Denies significant fatigue. Treatments tried: ibuprofen, cough drops, DayQuil, NyQuil. The treatment provided mild relief.       ROS:    Review of Systems   HENT:  Positive for congestion, ear pain and sore throat. Negative for rhinorrhea, sinus pain and sneezing.    Respiratory:  Positive for cough. Negative for wheezing.    Cardiovascular:  Negative for chest pain.   Gastrointestinal:  Negative for abdominal pain, diarrhea, nausea and vomiting.   Genitourinary:  Negative for dysuria.   Musculoskeletal:  Negative for joint pain and neck pain.   Skin:  Negative for rash.   Neurological:  Negative for headaches.       Medications:      No current outpatient medications on file.       Allergies:     Allergies   Allergen Reactions    Morphine      \"sweat, nausea and neck pain\"       Problem List:     Patient Active Problem List   Diagnosis    Vitamin D insufficiency    Preventative health care    Overweight (BMI 25.0-29.9)    Knee pain, chronic    Acute ear pain, right    Other fatigue    Otalgia of both ears    Non-recurrent acute serous otitis media of left ear       Surgical History:    No past surgical history on file.    Past Social Hx:     Social History     Socioeconomic History    Marital status: Single    Highest education level: Some college, no degree   Tobacco Use    Smoking status: Never    " Smokeless tobacco: Never   Vaping Use    Vaping Use: Never used   Substance and Sexual Activity    Alcohol use: Yes     Comment: Occasional    Drug use: No    Sexual activity: Yes     Partners: Female     Birth control/protection: Condom     Social Determinants of Health     Financial Resource Strain: Low Risk  (4/12/2022)    Overall Financial Resource Strain (CARDIA)     Difficulty of Paying Living Expenses: Not hard at all   Food Insecurity: No Food Insecurity (4/12/2022)    Hunger Vital Sign     Worried About Running Out of Food in the Last Year: Never true     Ran Out of Food in the Last Year: Never true   Transportation Needs: No Transportation Needs (4/12/2022)    PRAPARE - Transportation     Lack of Transportation (Medical): No     Lack of Transportation (Non-Medical): No   Physical Activity: Sufficiently Active (4/12/2022)    Exercise Vital Sign     Days of Exercise per Week: 7 days     Minutes of Exercise per Session: 150+ min   Stress: No Stress Concern Present (4/12/2022)    [employer] - Occupational Stress Questionnaire     Feeling of Stress : Not at all   Social Connections: Moderately Isolated (4/12/2022)    Social Connection and Isolation Panel [NHANES]     Frequency of Communication with Friends and Family: Three times a week     Frequency of Social Gatherings with Friends and Family: Once a week     Attends Muslim Services: 1 to 4 times per year     Active Member of Clubs or Organizations: No     Attends Club or Organization Meetings: Never     Marital Status: Never    Housing Stability: Unknown (4/12/2022)    Housing Stability Vital Sign     Unable to Pay for Housing in the Last Year: No        Past Family Hx:      Family History   Problem Relation Age of Onset    Diabetes Mother     Hypertension Mother     No Known Problems Sister        Problem list, medications, and allergies reviewed by myself today in Epic.     Objective:     Vitals:    01/22/24 1549    BP: 120/76   Pulse: 77   Resp: 16   Temp: 36.8 °C (98.3 °F)   SpO2: 95%       Physical Exam  Vitals reviewed.   Constitutional:       General: He is not in acute distress.     Appearance: Normal appearance. He is not ill-appearing, toxic-appearing or diaphoretic.   HENT:      Head: Normocephalic and atraumatic.      Right Ear: External ear normal.      Left Ear: External ear normal.      Nose: Nose normal.      Mouth/Throat:      Mouth: Mucous membranes are moist.      Pharynx: Oropharynx is clear.   Eyes:      Extraocular Movements: Extraocular movements intact.      Conjunctiva/sclera: Conjunctivae normal.      Pupils: Pupils are equal, round, and reactive to light.   Cardiovascular:      Rate and Rhythm: Normal rate and regular rhythm.      Pulses: Normal pulses.      Heart sounds: Normal heart sounds.   Pulmonary:      Effort: Pulmonary effort is normal.      Breath sounds: Normal breath sounds.   Abdominal:      General: Abdomen is flat.      Palpations: Abdomen is soft.   Musculoskeletal:         General: Normal range of motion.      Cervical back: Normal range of motion and neck supple. No rigidity or tenderness.   Lymphadenopathy:      Cervical: No cervical adenopathy.   Skin:     General: Skin is warm and dry.   Neurological:      General: No focal deficit present.      Mental Status: He is alert and oriented to person, place, and time. Mental status is at baseline.   Psychiatric:         Mood and Affect: Mood normal.         Behavior: Behavior normal.         Thought Content: Thought content normal.         Judgment: Judgment normal.             Results from POCT:   Results for orders placed or performed in visit on 01/22/24   POCT CEPHEID GROUP A STREP - PCR   Result Value Ref Range    POC Group A Strep, PCR Not Detected Not Detected, Invalid   POCT CoV-2, Flu A/B, RSV by PCR   Result Value Ref Range    SARS-CoV-2 by PCR Negative Negative, Invalid    Influenza virus A RNA Negative Negative, Invalid     Influenza virus B, PCR Negative Negative, Invalid    RSV, PCR Negative Negative, Invalid       Assessment/Plan:     Diagnosis and associated orders:     1. Viral upper respiratory tract infection  - POCT CoV-2, Flu A/B, RSV by PCR    2. Acute cough  - promethazine-dextromethorphan (PROMETHAZINE-DM) 6.25-15 MG/5ML syrup; Take 5 mL by mouth at bedtime as needed for Cough.  Dispense: 118 mL; Refill: 0  - benzonatate (TESSALON) 100 MG Cap; Take 1 Capsule by mouth 3 times a day as needed for Cough.  Dispense: 60 Capsule; Refill: 0    3. Acute pharyngitis, unspecified etiology  - POCT CEPHEID GROUP A STREP - PCR  - lidocaine (XYLOCAINE) 2 % Solution; Take 15 mL by mouth 3 times a day as needed for Throat/Mouth Pain (Swish and spit as needed for severe throat pain) for up to 5 days.  Dispense: 100 mL; Refill: 0          Comments/MDM:     POCT COVID, flu, RSV, STREP negative in clinic  Patient is well appearing and in no acute distress  No evidence of lower respiratory involvement on exam today   May use benzonatate and promethazine-DM PRN for cough   Lidocaine swish and spit PRN For pharyngitis     We discussed a throat culture but due to short duration of symptoms and the fact it is unlikely to  we will hold off for now.  If the patient continues to have symptoms I advised to return.  The natural history of mono would suggest that the patient has not developed antibodies and so I will not do a mono spot at this time.  If the patient continues to have pharyngitis and constitutional symptoms I instructed them to return 2 weeks for Monospot.     Recommend symptomatic care:  OTC second generation antihistamine daily (cetirizine, desloratadine, fexofenadine, levocetirizine, and loratadine) daily IN COMBINATION WITH:  OTC decongestant (Sudafed - Pseudoephedrine) unless contraindication in place, such as hypertension, CAD, narrow-angle glaucoma. Use with caution if the patient has a history of cardiac  dysrhythmias, hyperthyroidism, DM, prostatic hypertrophy, and glaucoma should use with caution.  Intranasal fluticasone (Flonase) daily  Nasal saline rinses 2-3 times a day   May use short term nasal sprays, such as oxymetazoline (Afrin) to help relieve nasal discomfort, congestion, and/or pressure. Decongestant sprays should not be used longer than three consecutive days.   Nasal rinsing with saline nasal spray or salt water (e.g., neti pot) can help relieve nasal dryness.  Breathe Right nasal strips at night for nasal congestion  Ponaris nasal emmollient for nasal congestion, dryness, and inflammation (do not use with iodine sensitivity)  Cool mist humidification, chest rubs, warm tea with honey, increased fluid intake to thin secretions  Tylenol or ibuprofen as needed for fever control, body aches, and headaches.    If sore throat is present:   Warm salt water gargles, over-the-counter throat sprays, rest, hydration with frozen (eg, ice or popsicles) or warmed liquids, herbal tea containing licorice root, elm inner bark, marshmallow root, and licorice root aqueous dry extract, Cepacol lozenges, soft diet, honey, vitamin C, zinc lozenges, and elderberry supplements.  I considered other causes of pharyngitis including Group C, G strep, peritonsillar abscess, adam's angina, and retropharyngeal abscess but the patient's reported symptoms and my exam do not support these alternative diagnosis based on information I have available today.  This may change and I encouraged the patient to return to clinic if they are experiencing new symptoms or their symptoms fail to resolve with time as we cannot rule out all pathology from a single Urgent Care visit.   Return to clinic or go to the ED if symptoms worsen or fail to improve, or if patient should develop worsening/increasing sore throat, difficulty swallowing, drooling, change in voice, swollen glands, shortness of breath, ear pain, cough, congestion, fever/chills,  and/or any concerning symptoms.          All questions answered. Patient verbalized understanding and is in agreement with this plan of care.     If symptoms are worsening or not improving in 3-5 days, follow-up with PCP or return to UC. Differential diagnosis, natural history, and supportive care discussed. AVS handout given and reviewed with patient. Patient educated on red flags and when to seek treatment back in ED or UC.     I personally reviewed prior external notes and test results pertinent to today's visit.  I have independently reviewed and interpreted all diagnostics ordered during this urgent care visit.     This dictation has been created using voice recognition software. The accuracy of the dictation is limited by the abilities of the software. I expect there may be some errors of grammar and possibly content. I made every attempt to manually correct the errors within my dictation. However, errors related to voice recognition software may still exist and should be interpreted within the appropriate context.    This note was electronically signed by LAITH Herrera

## 2024-01-28 ENCOUNTER — OFFICE VISIT (OUTPATIENT)
Dept: URGENT CARE | Facility: PHYSICIAN GROUP | Age: 24
End: 2024-01-28
Payer: OTHER GOVERNMENT

## 2024-01-28 VITALS
BODY MASS INDEX: 26.31 KG/M2 | OXYGEN SATURATION: 94 % | WEIGHT: 205 LBS | HEIGHT: 74 IN | SYSTOLIC BLOOD PRESSURE: 110 MMHG | HEART RATE: 77 BPM | TEMPERATURE: 97.1 F | DIASTOLIC BLOOD PRESSURE: 74 MMHG | RESPIRATION RATE: 20 BRPM

## 2024-01-28 DIAGNOSIS — B96.89 BACTERIAL LOWER RESPIRATORY INFECTION: ICD-10-CM

## 2024-01-28 DIAGNOSIS — J22 BACTERIAL LOWER RESPIRATORY INFECTION: ICD-10-CM

## 2024-01-28 PROCEDURE — 99213 OFFICE O/P EST LOW 20 MIN: CPT | Performed by: PHYSICIAN ASSISTANT

## 2024-01-28 PROCEDURE — 3078F DIAST BP <80 MM HG: CPT | Performed by: PHYSICIAN ASSISTANT

## 2024-01-28 PROCEDURE — 3074F SYST BP LT 130 MM HG: CPT | Performed by: PHYSICIAN ASSISTANT

## 2024-01-28 RX ORDER — AMOXICILLIN AND CLAVULANATE POTASSIUM 875; 125 MG/1; MG/1
1 TABLET, FILM COATED ORAL 2 TIMES DAILY
Qty: 14 TABLET | Refills: 0 | Status: SHIPPED | OUTPATIENT
Start: 2024-01-28 | End: 2024-02-04

## 2024-01-28 RX ORDER — METHYLPREDNISOLONE 4 MG/1
4 TABLET ORAL DAILY
Qty: 21 TABLET | Refills: 0 | Status: SHIPPED | OUTPATIENT
Start: 2024-01-28

## 2024-01-28 ASSESSMENT — ENCOUNTER SYMPTOMS
SHORTNESS OF BREATH: 0
EYE DISCHARGE: 0
DIZZINESS: 0
SORE THROAT: 0
ABDOMINAL PAIN: 0
EYE PAIN: 0
SPUTUM PRODUCTION: 1
WHEEZING: 0
EYE REDNESS: 0
DIAPHORESIS: 0
HEADACHES: 0
CHILLS: 0
FEVER: 0
CONSTIPATION: 0
SINUS PAIN: 0
VOMITING: 0
DIARRHEA: 0
COUGH: 1
NAUSEA: 0

## 2024-01-28 NOTE — PROGRESS NOTES
"  Subjective:     Oleksandr Stevens  is a 23 y.o. male who presents for Cough (X2 weeks)       He presents today with a cough has been lingering over the last 2 weeks.  Please recall he was seen in urgent care on 1/20/2022  Number his cough suppressant medications but did not receive any benefit from these.  At this time he denies fever/chills/sweats, chest pain, shortness of breath, nausea/vomiting, abdominal pain, diarrhea.       Review of Systems   Constitutional:  Negative for chills, diaphoresis, fever and malaise/fatigue.   HENT:  Negative for congestion, ear discharge, sinus pain and sore throat.    Eyes:  Negative for pain, discharge and redness.   Respiratory:  Positive for cough and sputum production. Negative for shortness of breath and wheezing.    Cardiovascular:  Negative for chest pain.   Gastrointestinal:  Negative for abdominal pain, constipation, diarrhea, nausea and vomiting.   Neurological:  Negative for dizziness and headaches.      Allergies   Allergen Reactions    Morphine      \"sweat, nausea and neck pain\"     History reviewed. No pertinent past medical history.     Objective:   /74 (BP Location: Left arm, Patient Position: Sitting, BP Cuff Size: Adult)   Pulse 77   Temp 36.2 °C (97.1 °F) (Temporal)   Resp 20   Ht 1.88 m (6' 2\")   Wt 93 kg (205 lb)   SpO2 94%   BMI 26.32 kg/m²   Physical Exam  Vitals and nursing note reviewed.   Constitutional:       General: He is not in acute distress.     Appearance: Normal appearance. He is not ill-appearing, toxic-appearing or diaphoretic.   HENT:      Head: Normocephalic.      Right Ear: Tympanic membrane, ear canal and external ear normal. There is no impacted cerumen.      Left Ear: Tympanic membrane, ear canal and external ear normal. There is no impacted cerumen.      Nose: No congestion or rhinorrhea.      Mouth/Throat:      Mouth: Mucous membranes are moist.      Pharynx: No oropharyngeal exudate or posterior oropharyngeal erythema. "   Eyes:      General:         Right eye: No discharge.         Left eye: No discharge.      Conjunctiva/sclera: Conjunctivae normal.   Cardiovascular:      Rate and Rhythm: Normal rate and regular rhythm.   Pulmonary:      Effort: Pulmonary effort is normal. No respiratory distress.      Breath sounds: No stridor. Rhonchi (Clears with coughing) present. No wheezing.   Musculoskeletal:      Cervical back: Neck supple.   Lymphadenopathy:      Cervical: No cervical adenopathy.   Neurological:      General: No focal deficit present.      Mental Status: He is alert and oriented to person, place, and time.   Psychiatric:         Mood and Affect: Mood normal.         Behavior: Behavior normal.         Thought Content: Thought content normal.         Judgment: Judgment normal.             Diagnostic testing: None    Assessment/Plan:     Encounter Diagnoses   Name Primary?    Bacterial lower respiratory infection           Plan for care for today's complaint includes starting the patient on Augmentin and Medrol Dosepak for bacterial respiratory infection.  Continue Rest medications that were prescribed to him at his last office visit.  Continue to monitor symptoms and return to urgent care or follow-up with primary care provider if symptoms remain ongoing.  Follow-up in the emergency department if symptoms become severe, ER precautions discussed in office today..  Prescription for Augmentin, Medrol Dosepak provided.    See AVS Instructions below for written guidance provided to patient on after-visit management and care in addition to our verbal discussion during the visit.    Please note that this dictation was created using voice recognition software. I have attempted to correct all errors, but there may be sound-alike, spelling, grammar and possibly content errors that I did not discover before finalizing the note.    Mauricio Thapa PA-C

## 2024-08-08 ENCOUNTER — HOSPITAL ENCOUNTER (OUTPATIENT)
Dept: LAB | Facility: MEDICAL CENTER | Age: 24
End: 2024-08-08
Payer: COMMERCIAL

## 2024-08-08 ENCOUNTER — OFFICE VISIT (OUTPATIENT)
Dept: URGENT CARE | Facility: PHYSICIAN GROUP | Age: 24
End: 2024-08-08
Payer: COMMERCIAL

## 2024-08-08 ENCOUNTER — HOSPITAL ENCOUNTER (OUTPATIENT)
Facility: MEDICAL CENTER | Age: 24
End: 2024-08-08
Payer: COMMERCIAL

## 2024-08-08 VITALS
TEMPERATURE: 97.9 F | WEIGHT: 207 LBS | DIASTOLIC BLOOD PRESSURE: 68 MMHG | BODY MASS INDEX: 26.56 KG/M2 | HEIGHT: 74 IN | SYSTOLIC BLOOD PRESSURE: 106 MMHG | OXYGEN SATURATION: 99 % | HEART RATE: 71 BPM | RESPIRATION RATE: 18 BRPM

## 2024-08-08 DIAGNOSIS — Z20.2 STD EXPOSURE: ICD-10-CM

## 2024-08-08 DIAGNOSIS — B37.0 ORAL THRUSH: ICD-10-CM

## 2024-08-08 LAB
APPEARANCE UR: CLEAR
BILIRUB UR STRIP-MCNC: NORMAL MG/DL
COLOR UR AUTO: YELLOW
GLUCOSE UR STRIP.AUTO-MCNC: NORMAL MG/DL
HBV CORE AB SERPL QL IA: NONREACTIVE
HBV SURFACE AB SERPL IA-ACNC: <3.5 MIU/ML (ref 0–10)
HBV SURFACE AG SER QL: NORMAL
HCV AB SER QL: NORMAL
HIV 1+2 AB+HIV1 P24 AG SERPL QL IA: NORMAL
KETONES UR STRIP.AUTO-MCNC: NORMAL MG/DL
LEUKOCYTE ESTERASE UR QL STRIP.AUTO: NORMAL
NITRITE UR QL STRIP.AUTO: NORMAL
PH UR STRIP.AUTO: 6.5 [PH] (ref 5–8)
PROT UR QL STRIP: NORMAL MG/DL
RBC UR QL AUTO: NORMAL
SP GR UR STRIP.AUTO: 1.02
T PALLIDUM AB SER QL IA: NORMAL
UROBILINOGEN UR STRIP-MCNC: 0.2 MG/DL

## 2024-08-08 PROCEDURE — 87389 HIV-1 AG W/HIV-1&-2 AB AG IA: CPT

## 2024-08-08 PROCEDURE — 87491 CHLMYD TRACH DNA AMP PROBE: CPT

## 2024-08-08 PROCEDURE — 81002 URINALYSIS NONAUTO W/O SCOPE: CPT

## 2024-08-08 PROCEDURE — 86803 HEPATITIS C AB TEST: CPT

## 2024-08-08 PROCEDURE — 36415 COLL VENOUS BLD VENIPUNCTURE: CPT

## 2024-08-08 PROCEDURE — 86706 HEP B SURFACE ANTIBODY: CPT

## 2024-08-08 PROCEDURE — 87591 N.GONORRHOEAE DNA AMP PROB: CPT

## 2024-08-08 PROCEDURE — 87340 HEPATITIS B SURFACE AG IA: CPT

## 2024-08-08 PROCEDURE — 99214 OFFICE O/P EST MOD 30 MIN: CPT

## 2024-08-08 PROCEDURE — 86780 TREPONEMA PALLIDUM: CPT

## 2024-08-08 PROCEDURE — 86704 HEP B CORE ANTIBODY TOTAL: CPT

## 2024-08-08 RX ORDER — DOXYCYCLINE HYCLATE 100 MG
100 TABLET ORAL 2 TIMES DAILY
Qty: 14 TABLET | Refills: 0 | Status: SHIPPED | OUTPATIENT
Start: 2024-08-08 | End: 2024-08-15

## 2024-08-08 RX ORDER — CLOTRIMAZOLE 10 MG/1
10 LOZENGE ORAL
Qty: 35 TROCHE | Refills: 0 | Status: SHIPPED | OUTPATIENT
Start: 2024-08-08 | End: 2024-08-15

## 2024-08-08 ASSESSMENT — ENCOUNTER SYMPTOMS: FEVER: 0

## 2024-08-08 NOTE — PROGRESS NOTES
"Subjective:     CHIEF COMPLAINT  Chief Complaint   Patient presents with    Exposure to STD     Pt exposed to chlamydia x 5 days        HPI  Oleksandr Stevens is a very pleasant 24 y.o. male who presents requesting STD testing.  He was informed yesterday that his sexual partner tested positive for chlamydia.  They did not use condoms on their most recent sexual interaction.  He denies any penile discharge or burning with urination at this time.  He denies any rashes, lesions, or sores.  He states he is otherwise feeling well at this time.  He has noticed over the past week that his tongue has appeared to have a white coating and has been stinging.  He denies a sore throat.  He does not have a history of asthma and does not have an inhaler.      REVIEW OF SYSTEMS  Review of Systems   Constitutional:  Negative for fever.   Genitourinary:  Negative for dysuria.   Skin:  Negative for itching and rash.       PAST MEDICAL HISTORY  Patient Active Problem List    Diagnosis Date Noted    Non-recurrent acute serous otitis media of left ear 01/13/2023    Otalgia of both ears 10/17/2022    Other fatigue 07/27/2022    Vitamin D insufficiency 04/14/2022    Preventative health care 04/14/2022    Overweight (BMI 25.0-29.9) 04/14/2022    Knee pain, chronic 04/14/2022    Acute ear pain, right 04/14/2022       SURGICAL HISTORY  patient denies any surgical history    ALLERGIES  Allergies   Allergen Reactions    Morphine      \"sweat, nausea and neck pain\"       CURRENT MEDICATIONS  Home Medications       Reviewed by Deysi Rivera P.A.-C. (Physician Assistant) on 08/08/24 at 0831  Med List Status: <None>     Medication Last Dose Status   methylPREDNISolone (MEDROL DOSEPAK) 4 MG Tablet Therapy Pack Not Taking Active                    SOCIAL HISTORY  Social History     Tobacco Use    Smoking status: Never    Smokeless tobacco: Never   Vaping Use    Vaping status: Never Used   Substance and Sexual Activity    Alcohol use: Yes     " "Comment: Occasional    Drug use: No    Sexual activity: Yes     Partners: Female     Birth control/protection: Condom       FAMILY HISTORY  Family History   Problem Relation Age of Onset    Diabetes Mother     Hypertension Mother     No Known Problems Sister           Objective:     VITAL SIGNS: /68 (BP Location: Right arm, Patient Position: Sitting, BP Cuff Size: Adult)   Pulse 71   Temp 36.6 °C (97.9 °F) (Temporal)   Resp 18   Ht 1.88 m (6' 2\")   Wt 93.9 kg (207 lb)   SpO2 99%   BMI 26.58 kg/m²     PHYSICAL EXAM  Physical Exam  Vitals reviewed.   Constitutional:       General: He is not in acute distress.     Appearance: Normal appearance. He is not ill-appearing or toxic-appearing.   HENT:      Head: Normocephalic and atraumatic.      Mouth/Throat:      Mouth: Mucous membranes are moist.      Comments: Dorsal surface of tongue with white coating.  Pharynx appears mildly erythematous.  Uvula is midline.  Eyes:      Conjunctiva/sclera: Conjunctivae normal.      Pupils: Pupils are equal, round, and reactive to light.   Pulmonary:      Effort: Pulmonary effort is normal. No respiratory distress.   Skin:     General: Skin is warm and dry.   Neurological:      General: No focal deficit present.      Mental Status: He is alert and oriented to person, place, and time.   Psychiatric:         Mood and Affect: Mood normal.         Assessment/Plan:     1. STD exposure  - POCT Urinalysis  - Chlamydia/GC, PCR (Urine); Future  - HIV AG/AB Combo Assay Screening; Future  - Hepatitis C Virus Antibody; Future  - HEP B Surface Antibody; Future  - Hep B Core AB Total; Future  - Hep B Surface Antigen; Future  - T.Pallidum AB ZEYAD (Screening); Future  - doxycycline (VIBRAMYCIN) 100 MG Tab; Take 1 Tablet by mouth 2 times a day for 7 days.  Dispense: 14 Tablet; Refill: 0    2. Oral thrush  - clotrimazole (MYCELEX) 10 MG Peggy peggy; Take 1 Peggy by mouth 5 Times a Day for 7 days.  Dispense: 35 Peggy; Refill: 0  -Return to " clinic if symptoms worsen or fail to resolve  -Follow-up with primary care provider    MDM/Comments:  Patient has stable vital signs and is non-toxic appearing.  Patient initiated on doxycycline for empiric treatment of chlamydia while results are pending.  Patient has politely declined empiric treatment for gonorrhea.  Testing for HIV, hepatitis C, hepatitis B, and syphilis ordered.  Patient initiated on clotrimazole troches for oral thrush.  Discussed supportive care with hydration, rest, Tylenol/Ibuprofen as needed. Patient demonstrated understanding of treatment plan at this time and will RTC if symptoms worsen or fail to resolve.     Differential diagnosis, natural history, supportive care, and indications for immediate follow-up discussed. All questions answered. Patient agrees with the plan of care.    Follow-up as needed if symptoms worsen or fail to improve to PCP, Urgent care or Emergency Room.    I have personally reviewed prior external notes and test results pertinent to today's visit.  I have independently reviewed and interpreted all diagnostics ordered during this urgent care acute visit.   Discussed management options (risks,benefits, and alternatives to treatment). Pt expresses understanding and the treatment plan was agreed upon. Questions were encouraged and answered to pt's satisfaction.    Please note that this dictation was created using voice recognition software. I have made a reasonable attempt to correct obvious errors, but I expect that there are errors of grammar and possibly content that I did not discover before finalizing the note.

## 2024-08-09 ENCOUNTER — TELEPHONE (OUTPATIENT)
Dept: URGENT CARE | Facility: PHYSICIAN GROUP | Age: 24
End: 2024-08-09
Payer: COMMERCIAL

## 2024-08-09 LAB
C TRACH DNA SPEC QL NAA+PROBE: NEGATIVE
N GONORRHOEA DNA SPEC QL NAA+PROBE: NEGATIVE
SPECIMEN SOURCE: NORMAL

## 2024-08-09 NOTE — TELEPHONE ENCOUNTER
Good afternoon,  Pt called to get results from lbs that were done yesterday and informed pt you were off and were waiting on some results still from the looks of it but will contact pt once you receive all results.   Thank you

## 2024-08-10 ENCOUNTER — TELEPHONE (OUTPATIENT)
Dept: URGENT CARE | Facility: CLINIC | Age: 24
End: 2024-08-10
Payer: COMMERCIAL

## 2024-08-10 NOTE — TELEPHONE ENCOUNTER
Patient called and informed of results. Patient will continue taking Doxycyline given history of confirmed chlamydia exposure.

## 2024-08-31 ENCOUNTER — HOSPITAL ENCOUNTER (OUTPATIENT)
Facility: MEDICAL CENTER | Age: 24
End: 2024-08-31
Attending: FAMILY MEDICINE
Payer: COMMERCIAL

## 2024-08-31 ENCOUNTER — OFFICE VISIT (OUTPATIENT)
Dept: URGENT CARE | Facility: PHYSICIAN GROUP | Age: 24
End: 2024-08-31
Payer: COMMERCIAL

## 2024-08-31 VITALS
WEIGHT: 210 LBS | SYSTOLIC BLOOD PRESSURE: 110 MMHG | BODY MASS INDEX: 26.95 KG/M2 | DIASTOLIC BLOOD PRESSURE: 58 MMHG | HEIGHT: 74 IN | RESPIRATION RATE: 16 BRPM | HEART RATE: 81 BPM | OXYGEN SATURATION: 99 % | TEMPERATURE: 98.6 F

## 2024-08-31 DIAGNOSIS — Z11.3 SCREENING EXAMINATION FOR STI: ICD-10-CM

## 2024-08-31 PROCEDURE — 87491 CHLMYD TRACH DNA AMP PROBE: CPT

## 2024-08-31 PROCEDURE — 87591 N.GONORRHOEAE DNA AMP PROB: CPT

## 2024-08-31 NOTE — PROGRESS NOTES
"   Chief Complaint   Patient presents with    Sexually Transmitted Diseases     wants a routine check up        HPI:    Requesting STI screening only.       Currently asymptomatic.   Requesting GC only       Currently sexually active with one partner.   Does not use condoms.          No past medical history on file.      Social History     Tobacco Use    Smoking status: Never    Smokeless tobacco: Never   Vaping Use    Vaping status: Never Used   Substance Use Topics    Alcohol use: Yes     Comment: Occasional    Drug use: No                 Review of Systems   Constitutional: Negative for fever, chills and malaise/fatigue.   Eyes: Negative for vision changes, d/c.    Respiratory: Negative for cough and sputum production.    Cardiovascular: Negative for chest pain and palpitations.   Gastrointestinal: Negative for nausea, vomiting, abdominal pain, diarrhea and constipation.   Genitourinary: Negative for dysuria, urgency and frequency.   Skin: Negative for rash or  itching.   Neurological: Negative for dizziness and tingling.   Psychiatric/Behavioral: Negative for depression.   Hematologic/lymphatic - denies bruising or excessive bleeding  All other systems reviewed and are negative.          Objective  /58 (BP Location: Left arm, Patient Position: Sitting, BP Cuff Size: Adult)   Pulse 81   Temp 37 °C (98.6 °F) (Temporal)   Resp 16   Ht 1.88 m (6' 2\")   Wt 95.3 kg (210 lb)   SpO2 99%         HEENT - PERRLA, EOMI  Neuro - alert and oriented x3.    Lungs - CTA.    Heart - regular rate       Musculoskeletal - No lower extremity edema noted.        A/P:    1. Screening examination for STI     - Chlamydia/GC, PCR (Urine); Future      "

## 2024-09-01 DIAGNOSIS — A74.9 CHLAMYDIA: ICD-10-CM

## 2024-09-01 LAB
C TRACH DNA SPEC QL NAA+PROBE: POSITIVE
N GONORRHOEA DNA SPEC QL NAA+PROBE: NEGATIVE
SPECIMEN SOURCE: ABNORMAL

## 2024-09-01 RX ORDER — DOXYCYCLINE HYCLATE 100 MG
100 TABLET ORAL 2 TIMES DAILY
Qty: 14 TABLET | Refills: 0 | Status: SHIPPED | OUTPATIENT
Start: 2024-09-01 | End: 2024-09-08

## 2024-09-05 ENCOUNTER — APPOINTMENT (OUTPATIENT)
Dept: URGENT CARE | Facility: PHYSICIAN GROUP | Age: 24
End: 2024-09-05
Payer: COMMERCIAL

## 2024-09-06 ENCOUNTER — OFFICE VISIT (OUTPATIENT)
Dept: URGENT CARE | Facility: PHYSICIAN GROUP | Age: 24
End: 2024-09-06
Payer: COMMERCIAL

## 2024-09-06 VITALS
HEART RATE: 90 BPM | BODY MASS INDEX: 26.31 KG/M2 | OXYGEN SATURATION: 98 % | DIASTOLIC BLOOD PRESSURE: 76 MMHG | RESPIRATION RATE: 18 BRPM | HEIGHT: 74 IN | TEMPERATURE: 98.2 F | SYSTOLIC BLOOD PRESSURE: 108 MMHG | WEIGHT: 205 LBS

## 2024-09-06 DIAGNOSIS — J06.9 UPPER RESPIRATORY INFECTION, ACUTE: ICD-10-CM

## 2024-09-06 LAB
FLUAV RNA SPEC QL NAA+PROBE: NEGATIVE
FLUBV RNA SPEC QL NAA+PROBE: NEGATIVE
RSV RNA SPEC QL NAA+PROBE: NEGATIVE
SARS-COV-2 RNA RESP QL NAA+PROBE: NEGATIVE

## 2024-09-06 PROCEDURE — 99213 OFFICE O/P EST LOW 20 MIN: CPT

## 2024-09-06 PROCEDURE — 0241U POCT CEPHEID COV-2, FLU A/B, RSV - PCR: CPT

## 2024-09-06 PROCEDURE — 3074F SYST BP LT 130 MM HG: CPT

## 2024-09-06 PROCEDURE — 3078F DIAST BP <80 MM HG: CPT

## 2024-09-06 RX ORDER — FLUTICASONE PROPIONATE 50 MCG
1 SPRAY, SUSPENSION (ML) NASAL DAILY
Qty: 16 G | Refills: 0 | Status: SHIPPED | OUTPATIENT
Start: 2024-09-06

## 2024-09-06 RX ORDER — BENZONATATE 100 MG/1
100 CAPSULE ORAL 3 TIMES DAILY PRN
Qty: 30 CAPSULE | Refills: 0 | Status: SHIPPED | OUTPATIENT
Start: 2024-09-06

## 2024-09-06 ASSESSMENT — ENCOUNTER SYMPTOMS
FEVER: 0
VOMITING: 0
SHORTNESS OF BREATH: 0
SORE THROAT: 1
NAUSEA: 0
COUGH: 1
CHILLS: 0
ABDOMINAL PAIN: 0

## 2024-09-07 NOTE — PROGRESS NOTES
Chief Complaint   Patient presents with    Congestion     R ear pain, x1 week        HISTORY OF PRESENT ILLNESS: Patient is a pleasant 24 y.o. male who presents to urgent care today ongoing congestion with right ear pain and a cough for the last couple of days.  Patient has not been taking anything for his symptoms, denies any major chronic medical conditions.    Patient Active Problem List    Diagnosis Date Noted    Non-recurrent acute serous otitis media of left ear 01/13/2023    Otalgia of both ears 10/17/2022    Other fatigue 07/27/2022    Vitamin D insufficiency 04/14/2022    Preventative health care 04/14/2022    Overweight (BMI 25.0-29.9) 04/14/2022    Knee pain, chronic 04/14/2022    Acute ear pain, right 04/14/2022       Allergies:Morphine    Current Outpatient Medications Ordered in Epic   Medication Sig Dispense Refill    fluticasone (FLONASE) 50 MCG/ACT nasal spray Administer 1 Spray into affected nostril(S) every day. 16 g 0    benzonatate (TESSALON) 100 MG Cap Take 1 Capsule by mouth 3 times a day as needed for Cough. 30 Capsule 0    doxycycline (VIBRAMYCIN) 100 MG Tab Take 1 Tablet by mouth 2 times a day for 7 days. (Patient not taking: Reported on 9/6/2024) 14 Tablet 0    methylPREDNISolone (MEDROL DOSEPAK) 4 MG Tablet Therapy Pack Take 1 Tablet by mouth every day. Follow schedule on package instructions. (Patient not taking: Reported on 8/8/2024) 21 Tablet 0     No current Epic-ordered facility-administered medications on file.       History reviewed. No pertinent past medical history.    Social History     Tobacco Use    Smoking status: Never    Smokeless tobacco: Never   Vaping Use    Vaping status: Never Used   Substance Use Topics    Alcohol use: Yes     Comment: Occasional    Drug use: No       Family Status   Relation Name Status    Mo  Alive    Fa unk Alive    Sis  Alive   No partnership data on file     Family History   Problem Relation Age of Onset    Diabetes Mother     Hypertension Mother  "    No Known Problems Sister        Review of Systems   Constitutional:  Negative for chills, fever and malaise/fatigue.   HENT:  Positive for congestion, ear pain and sore throat.    Respiratory:  Positive for cough. Negative for shortness of breath.    Gastrointestinal:  Negative for abdominal pain, nausea and vomiting.   Skin:  Negative for rash.       Exam:  /76 (BP Location: Left arm, Patient Position: Sitting, BP Cuff Size: Adult)   Pulse 90   Temp 36.8 °C (98.2 °F) (Temporal)   Resp 18   Ht 1.88 m (6' 2\")   Wt 93 kg (205 lb)   SpO2 98%   Physical Exam  Vitals reviewed.   Constitutional:       Appearance: Normal appearance.   HENT:      Head: Normocephalic.      Right Ear: Tympanic membrane and ear canal normal. There is no impacted cerumen. Tympanic membrane is not injected or erythematous.      Left Ear: Tympanic membrane and ear canal normal. There is no impacted cerumen. Tympanic membrane is not injected or erythematous.      Nose: Congestion present. No rhinorrhea.      Mouth/Throat:      Mouth: Mucous membranes are moist.      Pharynx: Oropharynx is clear. Posterior oropharyngeal erythema present. No oropharyngeal exudate.      Tonsils: No tonsillar exudate. 0 on the right. 0 on the left.   Eyes:      General:         Right eye: No discharge.         Left eye: No discharge.      Extraocular Movements: Extraocular movements intact.      Conjunctiva/sclera: Conjunctivae normal.      Pupils: Pupils are equal, round, and reactive to light.   Cardiovascular:      Rate and Rhythm: Normal rate and regular rhythm.      Pulses: Normal pulses.      Heart sounds: Normal heart sounds. No murmur heard.  Pulmonary:      Effort: Pulmonary effort is normal. No respiratory distress.      Breath sounds: Normal breath sounds. No stridor. No wheezing or rhonchi.   Chest:      Chest wall: No tenderness.   Musculoskeletal:         General: No swelling, tenderness, deformity or signs of injury. Normal range of " motion.      Cervical back: Normal range of motion.      Right lower leg: No edema.      Left lower leg: No edema.   Lymphadenopathy:      Cervical: No cervical adenopathy.   Skin:     General: Skin is warm and dry.      Capillary Refill: Capillary refill takes less than 2 seconds.      Findings: No bruising or rash.   Neurological:      General: No focal deficit present.      Mental Status: He is alert.      Sensory: No sensory deficit.      Motor: No weakness.      Coordination: Coordination normal.      Gait: Gait normal.   Psychiatric:         Mood and Affect: Mood normal.         Behavior: Behavior normal.         Thought Content: Thought content normal.         Judgment: Judgment normal.         Assessment/Plan:  1. Upper respiratory infection, acute  - POCT CoV-2, Flu A/B, RSV by PCR  - fluticasone (FLONASE) 50 MCG/ACT nasal spray; Administer 1 Spray into affected nostril(S) every day.  Dispense: 16 g; Refill: 0  - benzonatate (TESSALON) 100 MG Cap; Take 1 Capsule by mouth 3 times a day as needed for Cough.  Dispense: 30 Capsule; Refill: 0    Based on patient's physical presentation along with review of systems I do think they likely have a viral illness.  Patient is outside the window for flu treatment.  Vitals are within normal limits, lung sounds are clear to auscultation.  I did go ahead and place patient on Flonase and Tessalon Perles. Advised patient to drink plenty of fluids, take Motrin and Tylenol as needed, vitamin C, D.  Patient is aware of the plan of care and agreeable at this time, encouraged them to follow-up if they continue to get worse or do not improve.    Supportive care, differential diagnoses, and indications for immediate follow-up discussed with patient.   Pathogenesis of diagnosis discussed including typical length and natural progression.   Instructed to return to clinic or nearest emergency department for any change in condition, further concerns, or worsening of symptoms.  Patient  states understanding of the plan of care and discharge instructions.  Instructed to make an appointment, for follow up, with  primary care provider.      Please note that this dictation was created using voice recognition software. I have made every reasonable attempt to correct obvious errors, but I expect that there are errors of grammar and possibly content that I did not discover before finalizing the note.      Kelli HAQUE

## 2024-10-10 ENCOUNTER — OFFICE VISIT (OUTPATIENT)
Dept: URGENT CARE | Facility: CLINIC | Age: 24
End: 2024-10-10
Payer: COMMERCIAL

## 2024-10-10 VITALS
BODY MASS INDEX: 27.08 KG/M2 | DIASTOLIC BLOOD PRESSURE: 74 MMHG | WEIGHT: 211 LBS | TEMPERATURE: 98.6 F | RESPIRATION RATE: 17 BRPM | HEART RATE: 83 BPM | OXYGEN SATURATION: 96 % | HEIGHT: 74 IN | SYSTOLIC BLOOD PRESSURE: 108 MMHG

## 2024-10-10 DIAGNOSIS — R05.1 ACUTE COUGH: ICD-10-CM

## 2024-10-10 DIAGNOSIS — J98.8 RTI (RESPIRATORY TRACT INFECTION): ICD-10-CM

## 2024-10-10 PROCEDURE — 3074F SYST BP LT 130 MM HG: CPT | Performed by: NURSE PRACTITIONER

## 2024-10-10 PROCEDURE — 3078F DIAST BP <80 MM HG: CPT | Performed by: NURSE PRACTITIONER

## 2024-10-10 PROCEDURE — 99213 OFFICE O/P EST LOW 20 MIN: CPT | Performed by: NURSE PRACTITIONER

## 2024-10-10 RX ORDER — PREDNISONE 10 MG/1
TABLET ORAL
Qty: 20 TABLET | Refills: 0 | Status: SHIPPED | OUTPATIENT
Start: 2024-10-10

## 2024-10-10 RX ORDER — AZITHROMYCIN 250 MG/1
TABLET, FILM COATED ORAL
Qty: 6 TABLET | Refills: 0 | Status: SHIPPED | OUTPATIENT
Start: 2024-10-10

## 2024-10-10 RX ORDER — BENZONATATE 100 MG/1
100 CAPSULE ORAL 3 TIMES DAILY PRN
Qty: 60 CAPSULE | Refills: 0 | Status: SHIPPED | OUTPATIENT
Start: 2024-10-10

## 2024-11-13 ENCOUNTER — OFFICE VISIT (OUTPATIENT)
Dept: URGENT CARE | Facility: PHYSICIAN GROUP | Age: 24
End: 2024-11-13
Payer: COMMERCIAL

## 2024-11-13 ENCOUNTER — HOSPITAL ENCOUNTER (OUTPATIENT)
Dept: LAB | Facility: MEDICAL CENTER | Age: 24
End: 2024-11-13
Payer: COMMERCIAL

## 2024-11-13 VITALS
DIASTOLIC BLOOD PRESSURE: 70 MMHG | WEIGHT: 213 LBS | SYSTOLIC BLOOD PRESSURE: 116 MMHG | TEMPERATURE: 97.9 F | RESPIRATION RATE: 18 BRPM | OXYGEN SATURATION: 97 % | BODY MASS INDEX: 27.35 KG/M2 | HEART RATE: 66 BPM

## 2024-11-13 DIAGNOSIS — Z20.2 STD EXPOSURE: ICD-10-CM

## 2024-11-13 DIAGNOSIS — J06.9 UPPER RESPIRATORY INFECTION, ACUTE: ICD-10-CM

## 2024-11-13 DIAGNOSIS — J01.10 ACUTE NON-RECURRENT FRONTAL SINUSITIS: ICD-10-CM

## 2024-11-13 LAB
FLUAV RNA SPEC QL NAA+PROBE: NEGATIVE
FLUBV RNA SPEC QL NAA+PROBE: NEGATIVE
HBV CORE AB SERPL QL IA: NONREACTIVE
HBV SURFACE AB SERPL IA-ACNC: <3.5 MIU/ML (ref 0–10)
HBV SURFACE AG SER QL: NORMAL
HCV AB SER QL: NORMAL
HIV 1+2 AB+HIV1 P24 AG SERPL QL IA: NORMAL
RSV RNA SPEC QL NAA+PROBE: NEGATIVE
SARS-COV-2 RNA RESP QL NAA+PROBE: NEGATIVE
T PALLIDUM AB SER QL IA: NORMAL

## 2024-11-13 PROCEDURE — 87340 HEPATITIS B SURFACE AG IA: CPT

## 2024-11-13 PROCEDURE — 36415 COLL VENOUS BLD VENIPUNCTURE: CPT

## 2024-11-13 PROCEDURE — 99214 OFFICE O/P EST MOD 30 MIN: CPT

## 2024-11-13 PROCEDURE — 87389 HIV-1 AG W/HIV-1&-2 AB AG IA: CPT

## 2024-11-13 PROCEDURE — 86803 HEPATITIS C AB TEST: CPT

## 2024-11-13 PROCEDURE — 86780 TREPONEMA PALLIDUM: CPT

## 2024-11-13 PROCEDURE — 86704 HEP B CORE ANTIBODY TOTAL: CPT

## 2024-11-13 PROCEDURE — 0241U POCT CEPHEID COV-2, FLU A/B, RSV - PCR: CPT

## 2024-11-13 PROCEDURE — 86706 HEP B SURFACE ANTIBODY: CPT

## 2024-11-13 RX ORDER — FLUTICASONE PROPIONATE 50 MCG
2 SPRAY, SUSPENSION (ML) NASAL
Qty: 16 G | Refills: 1 | Status: SHIPPED | OUTPATIENT
Start: 2024-11-13

## 2024-11-13 ASSESSMENT — ENCOUNTER SYMPTOMS
BACK PAIN: 0
ABDOMINAL PAIN: 0
FEVER: 0
SHORTNESS OF BREATH: 0
CHILLS: 0
COUGH: 0
VOMITING: 0
NAUSEA: 0

## 2024-11-13 NOTE — PROGRESS NOTES
Chief Complaint   Patient presents with    Sexually Transmitted Diseases     Requesting test    Ear Pain     Bilateral x 3 days    Nasal Congestion       HISTORY OF PRESENT ILLNESS: Patient is a pleasant 24 y.o. male who presents to urgent care today patient is wishing to get STD testing, he states when he has intercourse with his partner that he experiences small cuts and he is slightly concerned.  He denies any ongoing sores, no penile discharge.  No pain from his abdomen.  No nausea or vomiting.    Secondary complaint of ongoing nasal congestion with bilateral ear pain.  This been ongoing for the last 3 days, patient has not taken anything for this.    Patient Active Problem List    Diagnosis Date Noted    Non-recurrent acute serous otitis media of left ear 01/13/2023    Otalgia of both ears 10/17/2022    Other fatigue 07/27/2022    Vitamin D insufficiency 04/14/2022    Preventative health care 04/14/2022    Overweight (BMI 25.0-29.9) 04/14/2022    Knee pain, chronic 04/14/2022    Acute ear pain, right 04/14/2022       Allergies:Patient has no known allergies.    Current Outpatient Medications Ordered in Epic   Medication Sig Dispense Refill    fluticasone (FLONASE) 50 MCG/ACT nasal spray Administer 2 Sprays into affected nostril(S) at bedtime. 16 g 1    amoxicillin-clavulanate (AUGMENTIN) 875-125 MG Tab Take 1 Tablet by mouth 2 times a day for 5 days. 10 Tablet 0    azithromycin (ZITHROMAX) 250 MG Tab Take two tabs on day one followed by one tab on days 2-5. (Patient not taking: Reported on 11/13/2024) 6 Tablet 0    predniSONE (DELTASONE) 10 MG Tab 40 mg x 2 days, then 30 mg x 2 days, then 20 mg x 2 days, then 10 mg x 2 days. Take with food. (Patient not taking: Reported on 11/13/2024) 20 Tablet 0    benzonatate (TESSALON) 100 MG Cap Take 1 Capsule by mouth 3 times a day as needed for Cough. (Patient not taking: Reported on 11/13/2024) 60 Capsule 0     No current Epic-ordered facility-administered medications on  file.       History reviewed. No pertinent past medical history.    Social History     Tobacco Use    Smoking status: Never    Smokeless tobacco: Never   Vaping Use    Vaping status: Never Used   Substance Use Topics    Alcohol use: Yes     Comment: Occasional    Drug use: No       Family Status   Relation Name Status    Mo  Alive    Fa unk Alive    Sis  Alive   No partnership data on file     Family History   Problem Relation Age of Onset    Diabetes Mother     Hypertension Mother     No Known Problems Sister        Review of Systems   Constitutional:  Negative for chills, fever and malaise/fatigue.   HENT:  Positive for congestion and ear pain.    Respiratory:  Negative for cough and shortness of breath.    Gastrointestinal:  Negative for abdominal pain, nausea and vomiting.   Genitourinary:  Negative for dysuria, frequency and urgency.        Denies penile discharge   Musculoskeletal:  Negative for back pain.       Exam:  /70 (BP Location: Left arm, Patient Position: Sitting, BP Cuff Size: Large adult)   Pulse 66   Temp 36.6 °C (97.9 °F) (Temporal)   Resp 18   Wt 96.6 kg (213 lb)   SpO2 97%   Physical Exam  Vitals reviewed.   Constitutional:       Appearance: Normal appearance.   HENT:      Head: Normocephalic.      Right Ear: Tympanic membrane, ear canal and external ear normal. There is no impacted cerumen. Tympanic membrane is not injected or erythematous.      Left Ear: Tympanic membrane, ear canal and external ear normal. There is no impacted cerumen. Tympanic membrane is not injected or erythematous.      Nose: Congestion present. No rhinorrhea.      Mouth/Throat:      Mouth: Mucous membranes are moist.      Pharynx: Oropharynx is clear. No oropharyngeal exudate.      Tonsils: No tonsillar exudate. 0 on the right. 0 on the left.   Eyes:      General:         Right eye: No discharge.         Left eye: No discharge.      Extraocular Movements: Extraocular movements intact.      Conjunctiva/sclera:  Conjunctivae normal.      Pupils: Pupils are equal, round, and reactive to light.   Cardiovascular:      Rate and Rhythm: Normal rate and regular rhythm.      Pulses: Normal pulses.      Heart sounds: Normal heart sounds. No murmur heard.  Pulmonary:      Effort: Pulmonary effort is normal. No respiratory distress.      Breath sounds: Normal breath sounds. No stridor. No wheezing.   Abdominal:      General: Abdomen is flat. Bowel sounds are normal.      Palpations: Abdomen is soft.      Tenderness: There is no abdominal tenderness. There is no right CVA tenderness, left CVA tenderness or guarding.   Genitourinary:     Comments: Per patient he is not circumcised, he is unsure if this is related to trauma from intercourse however he is not wishing a penile exam at this time, he states he has no sores currently.  Musculoskeletal:         General: Normal range of motion.      Cervical back: Normal range of motion.   Lymphadenopathy:      Cervical: No cervical adenopathy.   Skin:     General: Skin is warm and dry.      Capillary Refill: Capillary refill takes less than 2 seconds.      Findings: No bruising or rash.   Neurological:      General: No focal deficit present.      Mental Status: He is alert.      Sensory: No sensory deficit.      Motor: No weakness.   Psychiatric:         Mood and Affect: Mood normal.         Behavior: Behavior normal.         Thought Content: Thought content normal.         Judgment: Judgment normal.         Assessment/Plan:  1. STD exposure  - Chlamydia/GC, PCR (Urine); Future  - HIV AG/AB Combo Assay Screening; Future  - T.Pallidum AB ZEYAD (Screening); Future  - Trichomonas Vaginalis by TMA; Future  - Hepatitis C Virus Antibody; Future  - HEP B Surface Antibody; Future  - Hep B Core AB Total; Future  - Hep B Surface Antigen; Future  - UROGENITAL UREAPLASMA/MYCOPLASMA, PCR; Future    2. Upper respiratory infection, acute  - POCT CoV-2, Flu A/B, RSV by PCR  - fluticasone (FLONASE) 50 MCG/ACT  nasal spray; Administer 2 Sprays into affected nostril(S) at bedtime.  Dispense: 16 g; Refill: 1    3. Acute non-recurrent frontal sinusitis  - amoxicillin-clavulanate (AUGMENTIN) 875-125 MG Tab; Take 1 Tablet by mouth 2 times a day for 5 days.  Dispense: 10 Tablet; Refill: 0    Based on physical exam along with review of systems I did go ahead and order an STD panel.  Reviewed plan at length, advised no intercourse until results have been completed and any partner that has come and contact him should be treated as well.  Patient declined a penile exam.    Secondary complaint of ongoing upper respiratory issues to include nasal congestion and ear pain for the last 3 days.  Flonase was sent to the pharmacy, POCT flu and COVID was complete.  Encouraged the use of OTC medications.  Patient's COVID and flu was negative, I did send in contingency antibiotics for the patient, should he remain ill around the 6 and 7-day ernesto he was advised these are available to him.  Patient was notified via [email or web address].    Supportive care, differential diagnoses, and indications for immediate follow-up discussed with patient.   Pathogenesis of diagnosis discussed including typical length and natural progression.   Instructed to return to clinic or nearest emergency department for any change in condition, further concerns, or worsening of symptoms.  Patient states understanding of the plan of care and discharge instructions.  Instructed to make an appointment, for follow up, with primary care provider.    Please note that this dictation was created using voice recognition software. I have made every reasonable attempt to correct obvious errors, but I expect that there are errors of grammar and possibly content that I did not discover before finalizing the note.      Kelli HAQUE

## 2024-11-19 ENCOUNTER — TELEPHONE (OUTPATIENT)
Dept: URGENT CARE | Facility: PHYSICIAN GROUP | Age: 24
End: 2024-11-19
Payer: COMMERCIAL

## 2024-11-19 NOTE — TELEPHONE ENCOUNTER
Not collected in clinic please cancel this order to remove from in clinic order tasks and reorder as lab collect if needed. .

## 2025-02-05 ENCOUNTER — OFFICE VISIT (OUTPATIENT)
Dept: URGENT CARE | Facility: PHYSICIAN GROUP | Age: 25
End: 2025-02-05
Payer: COMMERCIAL

## 2025-02-05 VITALS
DIASTOLIC BLOOD PRESSURE: 62 MMHG | TEMPERATURE: 98.4 F | HEIGHT: 75 IN | BODY MASS INDEX: 26.89 KG/M2 | SYSTOLIC BLOOD PRESSURE: 118 MMHG | WEIGHT: 216.3 LBS | OXYGEN SATURATION: 95 % | RESPIRATION RATE: 16 BRPM | HEART RATE: 116 BPM

## 2025-02-05 DIAGNOSIS — J06.9 UPPER RESPIRATORY INFECTION, ACUTE: ICD-10-CM

## 2025-02-05 PROCEDURE — 1125F AMNT PAIN NOTED PAIN PRSNT: CPT

## 2025-02-05 PROCEDURE — 87651 STREP A DNA AMP PROBE: CPT

## 2025-02-05 PROCEDURE — 3078F DIAST BP <80 MM HG: CPT

## 2025-02-05 PROCEDURE — 99213 OFFICE O/P EST LOW 20 MIN: CPT

## 2025-02-05 PROCEDURE — 0241U POCT CEPHEID COV-2, FLU A/B, RSV - PCR: CPT

## 2025-02-05 PROCEDURE — 3074F SYST BP LT 130 MM HG: CPT

## 2025-02-05 RX ORDER — DEXTROMETHORPHAN HYDROBROMIDE AND PROMETHAZINE HYDROCHLORIDE 15; 6.25 MG/5ML; MG/5ML
5 SYRUP ORAL
Qty: 25 ML | Refills: 0 | Status: SHIPPED | OUTPATIENT
Start: 2025-02-05 | End: 2025-02-10

## 2025-02-05 RX ORDER — FLUTICASONE PROPIONATE 50 MCG
1 SPRAY, SUSPENSION (ML) NASAL DAILY
Qty: 16 G | Refills: 0 | Status: SHIPPED | OUTPATIENT
Start: 2025-02-05

## 2025-02-05 RX ORDER — BENZONATATE 100 MG/1
100 CAPSULE ORAL 3 TIMES DAILY PRN
Qty: 30 CAPSULE | Refills: 0 | Status: SHIPPED | OUTPATIENT
Start: 2025-02-05

## 2025-02-05 ASSESSMENT — ENCOUNTER SYMPTOMS
COUGH: 1
SHORTNESS OF BREATH: 0
NAUSEA: 0
SORE THROAT: 1
FEVER: 0
CHILLS: 0
VOMITING: 0
ABDOMINAL PAIN: 0

## 2025-02-05 ASSESSMENT — PAIN SCALES - GENERAL: PAINLEVEL_OUTOF10: 8=MODERATE-SEVERE PAIN

## 2025-02-05 NOTE — LETTER
February 5, 2025    To Whom It May Concern:         This is confirmation that Oleksandr Hilda attended his scheduled appointment with LAITH Kramer on 2/05/25. Please excuse from any missed class.           If you have any questions please do not hesitate to call me at the phone number listed below.    Sincerely,          DEBORA Kramer.  [phone]                 There are no Wet Read(s) to document.

## 2025-02-05 NOTE — PROGRESS NOTES
Chief Complaint   Patient presents with    Pharyngitis     X 2 days     Nasal Congestion    Body Aches     Chills and hot and cool        HISTORY OF PRESENT ILLNESS: Patient is a pleasant 24 y.o. male who presents to urgent care today cold and flulike symptoms for the last 2 days with increasing sore throat, body aches and chills.  Patient denies any chronic medical conditions.  Taking OTC meds with little to no relief.    Patient Active Problem List    Diagnosis Date Noted    Non-recurrent acute serous otitis media of left ear 01/13/2023    Otalgia of both ears 10/17/2022    Other fatigue 07/27/2022    Vitamin D insufficiency 04/14/2022    Preventative health care 04/14/2022    Overweight (BMI 25.0-29.9) 04/14/2022    Knee pain, chronic 04/14/2022    Acute ear pain, right 04/14/2022       Allergies:Patient has no known allergies.    Current Outpatient Medications Ordered in Epic   Medication Sig Dispense Refill    meloxicam (MOBIC) 7.5 MG Tab Take 1 Tablet by mouth 2 times a day as needed for Moderate Pain. 60 Tablet 0    fluticasone (FLONASE) 50 MCG/ACT nasal spray Administer 1 Spray into affected nostril(S) every day. (Patient not taking: Reported on 2/5/2025) 16 g 0    benzonatate (TESSALON) 100 MG Cap Take 1 Capsule by mouth 3 times a day as needed for Cough. (Patient not taking: Reported on 2/5/2025) 30 Capsule 0    promethazine-dextromethorphan (PROMETHAZINE-DM) 6.25-15 MG/5ML syrup Take 5 mL by mouth 1 time a day as needed for Cough (at night only) for up to 5 days. (Patient not taking: Reported on 2/5/2025) 25 mL 0    azithromycin (ZITHROMAX) 250 MG Tab Take two tabs on day one followed by one tab on days 2-5. (Patient not taking: Reported on 2/5/2025) 6 Tablet 0    predniSONE (DELTASONE) 10 MG Tab 40 mg x 2 days, then 30 mg x 2 days, then 20 mg x 2 days, then 10 mg x 2 days. Take with food. (Patient not taking: Reported on 2/5/2025) 20 Tablet 0     No current Epic-ordered facility-administered medications  "on file.       History reviewed. No pertinent past medical history.    Social History     Tobacco Use    Smoking status: Never    Smokeless tobacco: Never   Vaping Use    Vaping status: Never Used   Substance Use Topics    Alcohol use: Yes     Comment: Occasional    Drug use: No       Family Status   Relation Name Status    Mo  Alive    Fa unk Alive    Sis  Alive   No partnership data on file     Family History   Problem Relation Age of Onset    Diabetes Mother     Hypertension Mother     No Known Problems Sister        Review of Systems   Constitutional:  Negative for chills, fever and malaise/fatigue.   HENT:  Positive for sore throat. Negative for congestion and ear pain.    Respiratory:  Positive for cough. Negative for shortness of breath.    Gastrointestinal:  Negative for abdominal pain, nausea and vomiting.   Skin:  Negative for rash.       Exam:  /62 (BP Location: Right arm, Patient Position: Sitting, BP Cuff Size: Adult)   Pulse (!) 116   Temp 36.9 °C (98.4 °F) (Temporal)   Resp 16   Ht 1.905 m (6' 3\")   Wt 98.1 kg (216 lb 4.8 oz)   SpO2 95%   Physical Exam  Vitals reviewed.   Constitutional:       Appearance: Normal appearance. He is normal weight. He is ill-appearing.   HENT:      Head: Normocephalic.      Right Ear: Tympanic membrane and ear canal normal. There is no impacted cerumen. Tympanic membrane is not injected or erythematous.      Left Ear: Tympanic membrane and ear canal normal. There is no impacted cerumen. Tympanic membrane is not injected or erythematous.      Nose: Congestion and rhinorrhea present.      Mouth/Throat:      Mouth: Mucous membranes are moist.      Pharynx: Oropharynx is clear. Posterior oropharyngeal erythema present. No oropharyngeal exudate.      Tonsils: No tonsillar exudate. 2+ on the right. 2+ on the left.   Eyes:      General:         Right eye: No discharge.         Left eye: No discharge.      Extraocular Movements: Extraocular movements intact.      " Conjunctiva/sclera: Conjunctivae normal.      Pupils: Pupils are equal, round, and reactive to light.   Cardiovascular:      Rate and Rhythm: Regular rhythm. Tachycardia present.      Pulses: Normal pulses.      Heart sounds: Normal heart sounds. No murmur heard.  Pulmonary:      Effort: Pulmonary effort is normal. No respiratory distress.      Breath sounds: Normal breath sounds.   Musculoskeletal:         General: Normal range of motion.      Cervical back: Normal range of motion and neck supple.   Skin:     General: Skin is warm and dry.      Capillary Refill: Capillary refill takes less than 2 seconds.      Findings: No rash.   Neurological:      General: No focal deficit present.      Mental Status: He is alert.      Motor: No weakness.   Psychiatric:         Mood and Affect: Mood normal.         Behavior: Behavior normal.         Assessment/Plan:  1. Upper respiratory infection, acute  - POCT GROUP A STREP, PCR  - POCT CoV-2, Flu A/B, RSV by PCR  - fluticasone (FLONASE) 50 MCG/ACT nasal spray; Administer 1 Spray into affected nostril(S) every day. (Patient not taking: Reported on 2/5/2025)  Dispense: 16 g; Refill: 0  - benzonatate (TESSALON) 100 MG Cap; Take 1 Capsule by mouth 3 times a day as needed for Cough. (Patient not taking: Reported on 2/5/2025)  Dispense: 30 Capsule; Refill: 0  - promethazine-dextromethorphan (PROMETHAZINE-DM) 6.25-15 MG/5ML syrup; Take 5 mL by mouth 1 time a day as needed for Cough (at night only) for up to 5 days. (Patient not taking: Reported on 2/5/2025)  Dispense: 25 mL; Refill: 0    Based on patient's physical presentation along with review of systems I do think they likely have a viral illness.  Swab for potential viral causes and strep, both were negative.  Vitals are within normal limits, lung sounds are clear to auscultation.  I did go ahead and place patient on occasions for comfort measures. Advised patient to drink plenty of fluids, take Motrin and Tylenol as needed,  vitamin C, D.  Patient is aware of the plan of care and agreeable at this time, encouraged them to follow-up if they continue to get worse or do not improve.    Supportive care, differential diagnoses, and indications for immediate follow-up discussed with patient.   Pathogenesis of diagnosis discussed including typical length and natural progression.   Instructed to return to clinic or nearest emergency department for any change in condition, further concerns, or worsening of symptoms.  Patient states understanding of the plan of care and discharge instructions.  Instructed to make an appointment, for follow up, with primary care provider.    Please note that this dictation was created using voice recognition software. I have made every reasonable attempt to correct obvious errors, but I expect that there are errors of grammar and possibly content that I did not discover before finalizing the note.      Kelli HAQUE

## 2025-02-05 NOTE — LETTER
[facility] [place]  [address] [place] [address]  [place] [address]     February 5, 2025    Patient: Oleksandr Stevens   YOB: 2000   Date of Visit: 2/5/2025       To Whom It May Concern:    Oleksandr Stevens was seen and treated in our department on 2/5/2025. Please excuse from any missed work.      Sincerely,     DEBORA Kramer.

## 2025-05-30 ENCOUNTER — APPOINTMENT (OUTPATIENT)
Dept: MEDICAL GROUP | Facility: PHYSICIAN GROUP | Age: 25
End: 2025-05-30
Payer: COMMERCIAL

## 2025-07-23 ENCOUNTER — OFFICE VISIT (OUTPATIENT)
Dept: MEDICAL GROUP | Facility: CLINIC | Age: 25
End: 2025-07-23
Payer: COMMERCIAL

## 2025-07-23 ENCOUNTER — HOSPITAL ENCOUNTER (OUTPATIENT)
Facility: MEDICAL CENTER | Age: 25
End: 2025-07-23
Attending: STUDENT IN AN ORGANIZED HEALTH CARE EDUCATION/TRAINING PROGRAM
Payer: COMMERCIAL

## 2025-07-23 VITALS
DIASTOLIC BLOOD PRESSURE: 67 MMHG | WEIGHT: 206 LBS | TEMPERATURE: 98.4 F | RESPIRATION RATE: 14 BRPM | HEART RATE: 73 BPM | SYSTOLIC BLOOD PRESSURE: 102 MMHG | BODY MASS INDEX: 26.44 KG/M2 | HEIGHT: 74 IN | OXYGEN SATURATION: 94 %

## 2025-07-23 DIAGNOSIS — Z13.1 SCREENING FOR DIABETES MELLITUS: ICD-10-CM

## 2025-07-23 DIAGNOSIS — Z13.79 GENETIC SCREENING: Primary | ICD-10-CM
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
## 2025-07-23 DIAGNOSIS — R25.2 MUSCLE CRAMPS: ICD-10-CM

## 2025-07-23 DIAGNOSIS — E66.3 OVERWEIGHT WITH BODY MASS INDEX (BMI) OF 26 TO 26.9 IN ADULT: ICD-10-CM

## 2025-07-23 DIAGNOSIS — S60.861A TICK BITE OF RIGHT WRIST, INITIAL ENCOUNTER: ICD-10-CM

## 2025-07-23 LAB
EST. AVERAGE GLUCOSE BLD GHB EST-MCNC: 100 MG/DL
HBA1C MFR BLD: 5.1 % (ref 4–5.6)

## 2025-07-23 PROCEDURE — 3078F DIAST BP <80 MM HG: CPT | Performed by: STUDENT IN AN ORGANIZED HEALTH CARE EDUCATION/TRAINING PROGRAM

## 2025-07-23 PROCEDURE — 3074F SYST BP LT 130 MM HG: CPT | Performed by: STUDENT IN AN ORGANIZED HEALTH CARE EDUCATION/TRAINING PROGRAM

## 2025-07-23 PROCEDURE — 83036 HEMOGLOBIN GLYCOSYLATED A1C: CPT

## 2025-07-23 PROCEDURE — 36415 COLL VENOUS BLD VENIPUNCTURE: CPT

## 2025-07-23 PROCEDURE — 99213 OFFICE O/P EST LOW 20 MIN: CPT | Performed by: STUDENT IN AN ORGANIZED HEALTH CARE EDUCATION/TRAINING PROGRAM

## 2025-07-23 ASSESSMENT — PATIENT HEALTH QUESTIONNAIRE - PHQ9: CLINICAL INTERPRETATION OF PHQ2 SCORE: 0

## 2025-07-23 NOTE — PROGRESS NOTES
Family Medicine Clinic Note    Date: 7/23/2025    PPE used by provider during encounter: surgical mask    ASSESSMENT & PLAN    25-year-old healthy individual here to establish care.  Is in [facility] and went [place].  Reports multiple tick bites at that time.  They removed by medic developed a slight rash afterwards that has completely resolved.  No systemic symptoms.  Discussed Lyme disease would be unlikely however still possible that patient contracted Lyme given the distribution of [place].  Discussed that he would be outside the window of prophylactic doxycycline I am reassured that he is not having any systemic symptoms.  He can follow-up if he has any in the future.    Patient reporting some muscle cramps with strenuous exercise I suspect they are related to dehydration at the time also possibly muscle fatigue.  Discussed drinking electrolyte water when he is exercising vigorously instead of just plain water.  Patient can follow-up if does not improve.    1. Genetic screening  - Referral to Genetic Research Studies    2. Screening for diabetes mellitus    3. Overweight with body mass index (BMI) of 26 to 26.9 in adult  - HEMOGLOBIN A1C; Future    4. Muscle cramps    5. Tick bite of right wrist, initial encounter         SUBJECTIVE    Chief Complaint   Patient presents with    Establish Care     Tick bite 2-3 weeks ago    Requesting Labs       Oleksandr is a 25 y.o. person presenting today with the following concerns:    Hasn't been to doc in a few years  Wants some labs today    No major pmh  No PSH    Social  Etoh - occasional  Sexually active - STI testing last month was normal per patient  Student - in [facility] [facility]    Was in [place] and [place] few weeks ago    #tick bite  Felt sting of bite  Saw target lesion?  No symptoms, no headache, systemic symptoms,     #cramping after exercise  Strength training 3-4 times per day  Cramping after 10 mils of running  Gallon of water every  "day      OBJECTIVE  /67 (BP Location: Left arm, Patient Position: Sitting, BP Cuff Size: Large adult)   Pulse 73   Temp 36.9 °C (98.4 °F) (Temporal)   Resp 14   Ht 1.88 m (6' 2\")   Wt 93.4 kg (206 lb)   SpO2 94%   BMI 26.45 kg/m²      General: Well appearing, NAD  Heart: RRR  Lungs: breathing comfortably, CAB  Neuro: 2-12 grossly intact  Skin: no rashes      Eduardo Peguero MD   Family [facility]  [facility]    "

## 2025-07-25 ENCOUNTER — RESULTS FOLLOW-UP (OUTPATIENT)
Dept: MEDICAL GROUP | Facility: CLINIC | Age: 25
End: 2025-07-25
Payer: COMMERCIAL

## 2025-08-05 ENCOUNTER — OFFICE VISIT (OUTPATIENT)
Dept: URGENT CARE | Facility: CLINIC | Age: 25
End: 2025-08-05
Payer: COMMERCIAL

## 2025-08-05 VITALS
WEIGHT: 215 LBS | SYSTOLIC BLOOD PRESSURE: 118 MMHG | HEIGHT: 75 IN | OXYGEN SATURATION: 98 % | TEMPERATURE: 98.4 F | BODY MASS INDEX: 26.73 KG/M2 | DIASTOLIC BLOOD PRESSURE: 76 MMHG | RESPIRATION RATE: 16 BRPM | HEART RATE: 78 BPM

## 2025-08-05 DIAGNOSIS — J02.9 PHARYNGITIS, UNSPECIFIED ETIOLOGY: Primary | ICD-10-CM

## 2025-08-05 LAB — S PYO DNA SPEC NAA+PROBE: NOT DETECTED

## 2025-08-05 PROCEDURE — 99213 OFFICE O/P EST LOW 20 MIN: CPT | Performed by: STUDENT IN AN ORGANIZED HEALTH CARE EDUCATION/TRAINING PROGRAM

## 2025-08-05 PROCEDURE — 87651 STREP A DNA AMP PROBE: CPT | Performed by: STUDENT IN AN ORGANIZED HEALTH CARE EDUCATION/TRAINING PROGRAM

## 2025-08-05 PROCEDURE — 3078F DIAST BP <80 MM HG: CPT | Performed by: STUDENT IN AN ORGANIZED HEALTH CARE EDUCATION/TRAINING PROGRAM

## 2025-08-05 PROCEDURE — 3074F SYST BP LT 130 MM HG: CPT | Performed by: STUDENT IN AN ORGANIZED HEALTH CARE EDUCATION/TRAINING PROGRAM

## 2025-08-05 RX ORDER — AMOXICILLIN 875 MG/1
875 TABLET, COATED ORAL 2 TIMES DAILY
Qty: 14 TABLET | Refills: 0 | Status: SHIPPED | OUTPATIENT
Start: 2025-08-05 | End: 2025-08-12

## 2025-08-05 ASSESSMENT — ENCOUNTER SYMPTOMS
SORE THROAT: 1
FEVER: 0
CHILLS: 0